# Patient Record
Sex: FEMALE | Race: WHITE | Employment: UNEMPLOYED | URBAN - METROPOLITAN AREA
[De-identification: names, ages, dates, MRNs, and addresses within clinical notes are randomized per-mention and may not be internally consistent; named-entity substitution may affect disease eponyms.]

---

## 2017-03-08 PROBLEM — M19.042 PRIMARY OSTEOARTHRITIS OF BOTH HANDS: Status: ACTIVE | Noted: 2017-03-08

## 2017-03-08 PROBLEM — M19.041 PRIMARY OSTEOARTHRITIS OF BOTH HANDS: Status: ACTIVE | Noted: 2017-03-08

## 2017-05-08 PROBLEM — M15.9 PRIMARY OSTEOARTHRITIS INVOLVING MULTIPLE JOINTS: Status: ACTIVE | Noted: 2017-03-08

## 2018-05-21 PROBLEM — E11.21 TYPE 2 DIABETES WITH NEPHROPATHY (HCC): Status: ACTIVE | Noted: 2018-05-21

## 2019-03-25 ENCOUNTER — HOSPITAL ENCOUNTER (EMERGENCY)
Age: 84
Discharge: HOME | End: 2019-03-25
Attending: EMERGENCY MEDICINE
Payer: MEDICARE

## 2019-03-25 ENCOUNTER — APPOINTMENT (OUTPATIENT)
Dept: GENERAL RADIOLOGY | Age: 84
End: 2019-03-25
Attending: EMERGENCY MEDICINE
Payer: MEDICARE

## 2019-03-25 VITALS
BODY MASS INDEX: 19.81 KG/M2 | HEART RATE: 90 BPM | HEIGHT: 64 IN | OXYGEN SATURATION: 100 % | TEMPERATURE: 98.3 F | WEIGHT: 116 LBS | SYSTOLIC BLOOD PRESSURE: 168 MMHG | DIASTOLIC BLOOD PRESSURE: 80 MMHG | RESPIRATION RATE: 20 BRPM
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
## 2019-03-25 LAB
ATRIAL RATE: 300 BPM
CALCULATED R AXIS, ECG10: 79 DEGREES
CALCULATED T AXIS, ECG11: -119 DEGREES
DIAGNOSIS, 93000: NORMAL
Q-T INTERVAL, ECG07: 358 MS
QRS DURATION, ECG06: 128 MS
QTC CALCULATION (BEZET), ECG08: 428 MS
VENTRICULAR RATE, ECG03: 86 BPM

## 2019-03-25 PROCEDURE — 93005 ELECTROCARDIOGRAM TRACING: CPT | Performed by: EMERGENCY MEDICINE

## 2019-03-25 PROCEDURE — 73502 X-RAY EXAM HIP UNI 2-3 VIEWS: CPT

## 2019-03-25 PROCEDURE — 99284 EMERGENCY DEPT VISIT MOD MDM: CPT | Performed by: EMERGENCY MEDICINE

## 2019-03-25 RX ORDER — HYDROCODONE BITARTRATE AND ACETAMINOPHEN 5; 325 MG/1; MG/1
1 TABLET ORAL
Qty: 9 TAB | Refills: 0 | Status: SHIPPED | OUTPATIENT
Start: 2019-03-25 | End: 2019-03-28

## 2019-03-25 NOTE — DISCHARGE INSTRUCTIONS
Rest and use the pain medication as prescribed. Do not mix this with your tramadol. Follow up with your doctor for return for any other acute concerns.

## 2019-03-25 NOTE — ED PROVIDER NOTES
Patient is a 72-year-old female who comes to the emergency department today via EMS from her home. She suffered an accidental fall this morning around 6:30 AM when she got up from bed to use her bedside commode. She landed on her right hip. She was able to get back up but was having a lot of pain. Unable to walk on the legs due to pain. She did not lose consciousness. The history is provided by the patient, a relative and the EMS personnel. Fall The accident occurred 6 to 12 hours ago. The fall occurred while standing. She fell from a height of ground level. She landed on hard floor. There was no blood loss. The point of impact was the right hip. The pain is present in the right hip. The pain is moderate. She was not ambulatory at the scene. There was no entrapment after the fall. There was no drug use involved in the accident. There was no alcohol use involved in the accident. Pertinent negatives include no fever, no abdominal pain, no nausea, no loss of consciousness and no laceration. The risk factors include being elderly. The symptoms are aggravated by use of injured limb. She has tried nothing for the symptoms. Past Medical History:  
Diagnosis Date  Anxiety  Asthma  Atrial fibrillation ([address]  Atypical ductal hyperplasia of breast   
 Back pain TENS unit  CAD (coronary artery disease)  Constipation 6/17/2015  CVA (cerebral vascular accident) ([address] 10/29/2010  CVA (cerebral vascular accident) ([address] 10/29/2010  DM (diabetes mellitus) ([address]  DM (diabetes mellitus) ([address]  Osteoarthrosis, unspecified whether generalized or localized, hand 9/4/2013  Pain in joint, lower leg 9/4/2013 Past Surgical History:  
Procedure Laterality Date  HX CARPAL TUNNEL RELEASE    
 bilat surg  HX CATARACT REMOVAL  2005  
 left eye  HX CHOLECYSTECTOMY  HX CHOLECYSTECTOMY  HX OTHER SURGICAL    
 EYE SURGERY  
 HX RETINAL DETACHMENT REPAIR    
  TRIGGER POINT INJECTION Family History:  
Problem Relation Age of Onset  Asthma Mother  Heart Disease Mother  Dementia Father  Psychiatric Disorder Sister  Hypertension Sister  Cancer Sister Stomach  Hypertension Other Social History Socioeconomic History  Marital status:  Spouse name: Not on file  Number of children: Not on file  Years of education: Not on file  Highest education level: Not on file Occupational History  Not on file Social Needs  Financial resource strain: Not on file  Food insecurity:  
  Worry: Not on file Inability: Not on file  Transportation needs:  
  Medical: Not on file Non-medical: Not on file Tobacco Use  Smoking status: Former Smoker Packs/day: 0.50 Years: 30.00 Pack years: 15.00 Types: Cigarettes Last attempt to quit: 10/29/1985 Years since quittin.4  Smokeless tobacco: Never Used Substance and Sexual Activity  Alcohol use: No  
 Drug use: No  
 Sexual activity: Never Lifestyle  Physical activity:  
  Days per week: Not on file Minutes per session: Not on file  Stress: Not on file Relationships  Social connections:  
  Talks on phone: Not on file Gets together: Not on file Attends Taoist service: Not on file Active member of club or organization: Not on file Attends meetings of clubs or organizations: Not on file Relationship status: Not on file  Intimate partner violence:  
  Fear of current or ex partner: Not on file Emotionally abused: Not on file Physically abused: Not on file Forced sexual activity: Not on file Other Topics Concern  Not on file Social History Narrative . 2 children. Homemaker ALLERGIES: Codeine and Other medication Review of Systems Constitutional: Negative for chills, fatigue and fever. HENT: Negative for congestion, rhinorrhea and sore throat. Eyes: Negative for pain, discharge and visual disturbance. Respiratory: Negative for cough and shortness of breath. Cardiovascular: Negative for chest pain and palpitations. Gastrointestinal: Negative for abdominal pain, diarrhea and nausea. Endocrine: Negative for polydipsia and polyuria. Genitourinary: Negative for dysuria, frequency and urgency. Musculoskeletal: Negative for back pain and neck pain. Skin: Negative for rash. Neurological: Negative for seizures, loss of consciousness, syncope and weakness. Hematological: Negative. Vitals:  
 03/25/19 1253 BP: 150/73 Pulse: 97 Resp: 18 Temp: 97.9 °F (36.6 °C) SpO2: 96% Weight: 52.6 kg (116 lb) Height: 5' 4\" (1.626 m) Physical Exam  
Constitutional: She is oriented to person, place, and time. She appears well-developed and well-nourished. HENT:  
Head: Normocephalic and atraumatic. Eyes: Pupils are equal, round, and reactive to light. Conjunctivae and EOM are normal.  
Neck: Normal range of motion. Neck supple. Cardiovascular: Normal rate, regular rhythm and intact distal pulses. Pulmonary/Chest: Effort normal and breath sounds normal.  
Abdominal: Soft. There is no tenderness. There is no rebound and no guarding. Musculoskeletal: Normal range of motion. She exhibits tenderness. She exhibits no edema or deformity. Tender over the right hip and pain with any range of motion Lymphadenopathy:  
  She has no cervical adenopathy. Neurological: She is alert and oriented to person, place, and time. She has normal strength. No cranial nerve deficit or sensory deficit. GCS eye subscore is 4. GCS verbal subscore is 5. GCS motor subscore is 6. Skin: Skin is warm and dry. No laceration and no rash noted. Nursing note and vitals reviewed. MDM Number of Diagnoses or Management Options Diagnosis management comments: 1:45 PM 
 xray of the right hip shows extensive arthritic changes but no acute fracture or dislocation I discussed these results with patient and her family members. They report that she chronically takes Tylenol and tramadol at home for pain and that is not controlling her pain they are asking for some Lortab. She has taken that in the past.  I will prescribe some for a few days. Voice dictation software was used during the making of this note. This software is not perfect and grammatical and other typographical errors may be present. This note has been proofread, but may still contain errors. Aaron Paul MD; 3/25/2019 @1:45 PM  
=================================================================== Amount and/or Complexity of Data Reviewed Tests in the radiology section of CPT®: ordered and reviewed Obtain history from someone other than the patient: yes Review and summarize past medical records: yes Independent visualization of images, tracings, or specimens: yes Risk of Complications, Morbidity, and/or Mortality Presenting problems: low Diagnostic procedures: low Management options: minimal 
 
Patient Progress Patient progress: stable Procedures

## 2019-03-25 NOTE — ED TRIAGE NOTES
Pt arrives via [facility] from home with CC of fall. Pt fell this morning when getting up from bed and landed on right hip. EMS reports palpable popping. Patient reports pain 10/10 when moving. Previous history of left sided hip problems per EMS

## 2019-03-25 NOTE — ED NOTES
I have reviewed discharge instructions with the patient. The patient verbalized understanding. Patient left ED via Discharge Method: ambulatory to Home with family Opportunity for questions and clarification provided. Patient given 1 scripts. To continue your aftercare when you leave the hospital, you may receive an automated call from our care team to check in on how you are doing. This is a free service and part of our promise to provide the best care and service to meet your aftercare needs.  If you have questions, or wish to unsubscribe from this service please call [phone]. Thank you for Choosing our [facility] Emergency Department.

## 2019-11-23 ENCOUNTER — HOSPITAL ENCOUNTER (INPATIENT)
Age: 84
LOS: 3 days | Discharge: HOME HEALTH | DRG: 092 | End: 2019-11-26
Attending: EMERGENCY MEDICINE | Admitting: HOSPITALIST
Payer: MEDICARE

## 2019-11-23 ENCOUNTER — APPOINTMENT (OUTPATIENT)
Dept: CT IMAGING | Age: 84
DRG: 092 | End: 2019-11-23
Attending: EMERGENCY MEDICINE
Payer: MEDICARE

## 2019-11-23 DIAGNOSIS — R10.32 ABDOMINAL PAIN, LLQ (LEFT LOWER QUADRANT): Primary | ICD-10-CM

## 2019-11-23 PROBLEM — I25.10 CAD (CORONARY ARTERY DISEASE): Status: ACTIVE | Noted: 2019-11-23

## 2019-11-23 PROBLEM — N39.0 ACUTE UTI (URINARY TRACT INFECTION): Status: ACTIVE | Noted: 2019-11-23

## 2019-11-23 PROBLEM — F03.90 DEMENTIA (HCC): Status: ACTIVE | Noted: 2019-11-23

## 2019-11-23 PROBLEM — G93.40 ACUTE ENCEPHALOPATHY: Status: ACTIVE | Noted: 2019-11-23

## 2019-11-23 PROBLEM — R10.9 ABDOMINAL PAIN: Status: ACTIVE | Noted: 2019-11-23

## 2019-11-23 LAB
ALBUMIN SERPL-MCNC: 3.7 G/DL (ref 3.2–4.6)
ALBUMIN/GLOB SERPL: 1 {RATIO} (ref 1.2–3.5)
ALP SERPL-CCNC: 83 U/L (ref 50–136)
ALT SERPL-CCNC: 27 U/L (ref 12–65)
ANION GAP SERPL CALC-SCNC: 8 MMOL/L (ref 7–16)
AST SERPL-CCNC: 28 U/L (ref 15–37)
BACTERIA URNS QL MICRO: 0 /HPF
BASOPHILS # BLD: 0 K/UL (ref 0–0.2)
BASOPHILS NFR BLD: 0 % (ref 0–2)
BILIRUB SERPL-MCNC: 0.6 MG/DL (ref 0.2–1.1)
BUN SERPL-MCNC: 14 MG/DL (ref 8–23)
CALCIUM SERPL-MCNC: 9.8 MG/DL (ref 8.3–10.4)
CASTS URNS QL MICRO: NORMAL /LPF
CHLORIDE SERPL-SCNC: 96 MMOL/L (ref 98–107)
CO2 SERPL-SCNC: 28 MMOL/L (ref 21–32)
CREAT SERPL-MCNC: 0.81 MG/DL (ref 0.6–1)
DIFFERENTIAL METHOD BLD: ABNORMAL
EOSINOPHIL # BLD: 0 K/UL (ref 0–0.8)
EOSINOPHIL NFR BLD: 0 % (ref 0.5–7.8)
EPI CELLS #/AREA URNS HPF: NORMAL /HPF
ERYTHROCYTE [DISTWIDTH] IN BLOOD BY AUTOMATED COUNT: 13.7 % (ref 11.9–14.6)
GLOBULIN SER CALC-MCNC: 3.7 G/DL (ref 2.3–3.5)
GLUCOSE BLD STRIP.AUTO-MCNC: 190 MG/DL (ref 65–100)
GLUCOSE SERPL-MCNC: 104 MG/DL (ref 65–100)
HCT VFR BLD AUTO: 44.2 % (ref 35.8–46.3)
HGB BLD-MCNC: 14.8 G/DL (ref 11.7–15.4)
IMM GRANULOCYTES # BLD AUTO: 0 K/UL (ref 0–0.5)
IMM GRANULOCYTES NFR BLD AUTO: 0 % (ref 0–5)
LACTATE SERPL-SCNC: 2 MMOL/L (ref 0.4–2)
LYMPHOCYTES # BLD: 1.7 K/UL (ref 0.5–4.6)
LYMPHOCYTES NFR BLD: 13 % (ref 13–44)
MCH RBC QN AUTO: 29.4 PG (ref 26.1–32.9)
MCHC RBC AUTO-ENTMCNC: 33.5 G/DL (ref 31.4–35)
MCV RBC AUTO: 87.9 FL (ref 79.6–97.8)
MONOCYTES # BLD: 1.1 K/UL (ref 0.1–1.3)
MONOCYTES NFR BLD: 8 % (ref 4–12)
NEUTS SEG # BLD: 10.2 K/UL (ref 1.7–8.2)
NEUTS SEG NFR BLD: 78 % (ref 43–78)
NRBC # BLD: 0 K/UL (ref 0–0.2)
PLATELET # BLD AUTO: 346 K/UL (ref 150–450)
PMV BLD AUTO: 9.9 FL (ref 9.4–12.3)
POTASSIUM SERPL-SCNC: 4.7 MMOL/L (ref 3.5–5.1)
PROT SERPL-MCNC: 7.4 G/DL (ref 6.3–8.2)
RBC # BLD AUTO: 5.03 M/UL (ref 4.05–5.2)
RBC #/AREA URNS HPF: NORMAL /HPF
SODIUM SERPL-SCNC: 132 MMOL/L (ref 136–145)
WBC # BLD AUTO: 13.1 K/UL (ref 4.3–11.1)
WBC URNS QL MICRO: NORMAL /HPF

## 2019-11-23 PROCEDURE — 74011000258 HC RX REV CODE- 258: Performed by: EMERGENCY MEDICINE

## 2019-11-23 PROCEDURE — 51701 INSERT BLADDER CATHETER: CPT | Performed by: EMERGENCY MEDICINE

## 2019-11-23 PROCEDURE — 81015 MICROSCOPIC EXAM OF URINE: CPT

## 2019-11-23 PROCEDURE — 65270000029 HC RM PRIVATE

## 2019-11-23 PROCEDURE — 93005 ELECTROCARDIOGRAM TRACING: CPT | Performed by: HOSPITALIST

## 2019-11-23 PROCEDURE — 74011250636 HC RX REV CODE- 250/636: Performed by: EMERGENCY MEDICINE

## 2019-11-23 PROCEDURE — 82962 GLUCOSE BLOOD TEST: CPT

## 2019-11-23 PROCEDURE — 74011000250 HC RX REV CODE- 250: Performed by: EMERGENCY MEDICINE

## 2019-11-23 PROCEDURE — 87086 URINE CULTURE/COLONY COUNT: CPT

## 2019-11-23 PROCEDURE — 80053 COMPREHEN METABOLIC PANEL: CPT

## 2019-11-23 PROCEDURE — 96374 THER/PROPH/DIAG INJ IV PUSH: CPT | Performed by: EMERGENCY MEDICINE

## 2019-11-23 PROCEDURE — 83605 ASSAY OF LACTIC ACID: CPT

## 2019-11-23 PROCEDURE — 74177 CT ABD & PELVIS W/CONTRAST: CPT

## 2019-11-23 PROCEDURE — 74011636637 HC RX REV CODE- 636/637: Performed by: HOSPITALIST

## 2019-11-23 PROCEDURE — 77030011943

## 2019-11-23 PROCEDURE — 94760 N-INVAS EAR/PLS OXIMETRY 1: CPT

## 2019-11-23 PROCEDURE — 36415 COLL VENOUS BLD VENIPUNCTURE: CPT

## 2019-11-23 PROCEDURE — 85025 COMPLETE CBC W/AUTO DIFF WBC: CPT

## 2019-11-23 PROCEDURE — 74011636320 HC RX REV CODE- 636/320: Performed by: EMERGENCY MEDICINE

## 2019-11-23 PROCEDURE — 74011250637 HC RX REV CODE- 250/637: Performed by: EMERGENCY MEDICINE

## 2019-11-23 PROCEDURE — 77030020263 HC SOL INJ SOD CL0.9% LFCR 1000ML

## 2019-11-23 PROCEDURE — 96372 THER/PROPH/DIAG INJ SC/IM: CPT | Performed by: EMERGENCY MEDICINE

## 2019-11-23 PROCEDURE — 74011250636 HC RX REV CODE- 250/636: Performed by: HOSPITALIST

## 2019-11-23 PROCEDURE — 99285 EMERGENCY DEPT VISIT HI MDM: CPT | Performed by: EMERGENCY MEDICINE

## 2019-11-23 RX ORDER — SODIUM CHLORIDE 0.9 % (FLUSH) 0.9 %
5-40 SYRINGE (ML) INJECTION AS NEEDED
Status: DISCONTINUED | OUTPATIENT
Start: 2019-11-23 | End: 2019-11-26 | Stop reason: HOSPADM

## 2019-11-23 RX ORDER — LORAZEPAM 1 MG/1
1 TABLET ORAL
Status: COMPLETED | OUTPATIENT
Start: 2019-11-23 | End: 2019-11-23

## 2019-11-23 RX ORDER — DICLOFENAC SODIUM 10 MG/G
4 GEL TOPICAL 4 TIMES DAILY
Status: DISCONTINUED | OUTPATIENT
Start: 2019-11-23 | End: 2019-11-26 | Stop reason: HOSPADM

## 2019-11-23 RX ORDER — LANOLIN ALCOHOL/MO/W.PET/CERES
1000 CREAM (GRAM) TOPICAL
Status: DISCONTINUED | OUTPATIENT
Start: 2019-11-24 | End: 2019-11-26 | Stop reason: HOSPADM

## 2019-11-23 RX ORDER — ASPIRIN 325 MG
325 TABLET ORAL DAILY
Status: DISCONTINUED | OUTPATIENT
Start: 2019-11-24 | End: 2019-11-26 | Stop reason: HOSPADM

## 2019-11-23 RX ORDER — LORAZEPAM 0.5 MG/1
0.25 TABLET ORAL
Status: DISCONTINUED | OUTPATIENT
Start: 2019-11-23 | End: 2019-11-24

## 2019-11-23 RX ORDER — BISACODYL 5 MG
5 TABLET, DELAYED RELEASE (ENTERIC COATED) ORAL DAILY PRN
Status: DISCONTINUED | OUTPATIENT
Start: 2019-11-23 | End: 2019-11-26 | Stop reason: HOSPADM

## 2019-11-23 RX ORDER — LORAZEPAM 2 MG/ML
1 INJECTION INTRAMUSCULAR ONCE
Status: DISCONTINUED | OUTPATIENT
Start: 2019-11-23 | End: 2019-11-23 | Stop reason: SDUPTHER

## 2019-11-23 RX ORDER — ACETAMINOPHEN 325 MG/1
650 TABLET ORAL
Status: DISCONTINUED | OUTPATIENT
Start: 2019-11-23 | End: 2019-11-26 | Stop reason: HOSPADM

## 2019-11-23 RX ORDER — DEXTROSE 50 % IN WATER (D50W) INTRAVENOUS SYRINGE
25-50 AS NEEDED
Status: DISCONTINUED | OUTPATIENT
Start: 2019-11-23 | End: 2019-11-26 | Stop reason: HOSPADM

## 2019-11-23 RX ORDER — DONEPEZIL HYDROCHLORIDE 5 MG/1
5 TABLET, FILM COATED ORAL
Status: DISCONTINUED | OUTPATIENT
Start: 2019-11-23 | End: 2019-11-26 | Stop reason: HOSPADM

## 2019-11-23 RX ORDER — IPRATROPIUM BROMIDE AND ALBUTEROL SULFATE 2.5; .5 MG/3ML; MG/3ML
3 SOLUTION RESPIRATORY (INHALATION)
Status: DISCONTINUED | OUTPATIENT
Start: 2019-11-23 | End: 2019-11-26 | Stop reason: HOSPADM

## 2019-11-23 RX ORDER — TRAMADOL HYDROCHLORIDE 50 MG/1
50 TABLET ORAL
Status: DISCONTINUED | OUTPATIENT
Start: 2019-11-23 | End: 2019-11-24

## 2019-11-23 RX ORDER — FERROUS SULFATE, DRIED 160(50) MG
1 TABLET, EXTENDED RELEASE ORAL 2 TIMES DAILY WITH MEALS
Status: DISCONTINUED | OUTPATIENT
Start: 2019-11-24 | End: 2019-11-26 | Stop reason: HOSPADM

## 2019-11-23 RX ORDER — SODIUM CHLORIDE 0.9 % (FLUSH) 0.9 %
5-40 SYRINGE (ML) INJECTION EVERY 8 HOURS
Status: DISCONTINUED | OUTPATIENT
Start: 2019-11-23 | End: 2019-11-26 | Stop reason: HOSPADM

## 2019-11-23 RX ORDER — DOCUSATE SODIUM 100 MG/1
100 CAPSULE, LIQUID FILLED ORAL DAILY
Status: DISCONTINUED | OUTPATIENT
Start: 2019-11-24 | End: 2019-11-26 | Stop reason: HOSPADM

## 2019-11-23 RX ORDER — LORAZEPAM 2 MG/ML
INJECTION INTRAMUSCULAR
Status: DISCONTINUED
Start: 2019-11-23 | End: 2019-11-23 | Stop reason: SDUPTHER

## 2019-11-23 RX ORDER — FAMOTIDINE 20 MG/1
20 TABLET, FILM COATED ORAL
Status: DISCONTINUED | OUTPATIENT
Start: 2019-11-23 | End: 2019-11-26 | Stop reason: HOSPADM

## 2019-11-23 RX ORDER — BUDESONIDE 0.5 MG/2ML
500 INHALANT ORAL
Status: DISCONTINUED | OUTPATIENT
Start: 2019-11-24 | End: 2019-11-26 | Stop reason: HOSPADM

## 2019-11-23 RX ORDER — DEXTROSE 40 %
15 GEL (GRAM) ORAL AS NEEDED
Status: DISCONTINUED | OUTPATIENT
Start: 2019-11-23 | End: 2019-11-26 | Stop reason: HOSPADM

## 2019-11-23 RX ORDER — INSULIN LISPRO 100 [IU]/ML
INJECTION, SOLUTION INTRAVENOUS; SUBCUTANEOUS
Status: DISCONTINUED | OUTPATIENT
Start: 2019-11-23 | End: 2019-11-26 | Stop reason: HOSPADM

## 2019-11-23 RX ORDER — PRAVASTATIN SODIUM 20 MG/1
40 TABLET ORAL
Status: DISCONTINUED | OUTPATIENT
Start: 2019-11-23 | End: 2019-11-26 | Stop reason: HOSPADM

## 2019-11-23 RX ORDER — SODIUM CHLORIDE 0.9 % (FLUSH) 0.9 %
10 SYRINGE (ML) INJECTION
Status: COMPLETED | OUTPATIENT
Start: 2019-11-23 | End: 2019-11-23

## 2019-11-23 RX ORDER — SODIUM CHLORIDE 9 MG/ML
75 INJECTION, SOLUTION INTRAVENOUS CONTINUOUS
Status: DISCONTINUED | OUTPATIENT
Start: 2019-11-23 | End: 2019-11-26 | Stop reason: HOSPADM

## 2019-11-23 RX ORDER — ENOXAPARIN SODIUM 100 MG/ML
40 INJECTION SUBCUTANEOUS EVERY 24 HOURS
Status: DISCONTINUED | OUTPATIENT
Start: 2019-11-23 | End: 2019-11-26 | Stop reason: HOSPADM

## 2019-11-23 RX ORDER — DILTIAZEM HYDROCHLORIDE 240 MG/1
240 CAPSULE, COATED, EXTENDED RELEASE ORAL DAILY
Status: DISCONTINUED | OUTPATIENT
Start: 2019-11-24 | End: 2019-11-26 | Stop reason: HOSPADM

## 2019-11-23 RX ADMIN — Medication 10 ML: at 22:25

## 2019-11-23 RX ADMIN — INSULIN LISPRO 2 UNITS: 100 INJECTION, SOLUTION INTRAVENOUS; SUBCUTANEOUS at 22:28

## 2019-11-23 RX ADMIN — CEFTRIAXONE 1 G: 1 INJECTION, POWDER, FOR SOLUTION INTRAMUSCULAR; INTRAVENOUS at 19:11

## 2019-11-23 RX ADMIN — LORAZEPAM 1 MG: 1 TABLET ORAL at 17:02

## 2019-11-23 RX ADMIN — Medication 10 ML: at 17:50

## 2019-11-23 RX ADMIN — ENOXAPARIN SODIUM 40 MG: 40 INJECTION SUBCUTANEOUS at 22:15

## 2019-11-23 RX ADMIN — SODIUM CHLORIDE 1000 ML: 900 INJECTION, SOLUTION INTRAVENOUS at 19:12

## 2019-11-23 RX ADMIN — DIATRIZOATE MEGLUMINE AND DIATRIZOATE SODIUM 15 ML: 660; 100 LIQUID ORAL; RECTAL at 16:36

## 2019-11-23 RX ADMIN — IOPAMIDOL 100 ML: 755 INJECTION, SOLUTION INTRAVENOUS at 17:50

## 2019-11-23 RX ADMIN — SODIUM CHLORIDE 100 ML: 900 INJECTION, SOLUTION INTRAVENOUS at 17:50

## 2019-11-23 RX ADMIN — WATER 20 MG: 1 INJECTION INTRAMUSCULAR; INTRAVENOUS; SUBCUTANEOUS at 17:17

## 2019-11-23 NOTE — ED NOTES
Pt is yelling out. This nurse and Joylene Cowden entered room with pt attempting to exit bed and family attempting to calm pt. Pt is not able to be oriented, even after several attempts. Pt is combative and hitting family and staff intermittently. Pt yells out, but does not make sense. Pt attempted to pull out catheter. Attempt to reposition pt and re-orient, again, pt no susceptible. MD notified. IV ativan ordered. IV attmpted x 3 without success. Pt continues to be agitated, combative, and striking staff and family and trying to get out of bed. Pt is not able to ambulate. MD notified and request for IM medications. MD declines at this time and verbal order for 1mg Ativan PO obtained.

## 2019-11-23 NOTE — ED PROVIDER NOTES
Patient is a 60-year-old female with a past medical history significant for dementia who presents with worsening dementia today and some lower abdominal pain. Per caretaker there is no nausea or vomiting, there is no fevers or chills, no chest pain or shortness of breath.            Past Medical History:   Diagnosis Date    Anxiety     Asthma     Atrial fibrillation (HCC)     Atypical ductal hyperplasia of breast     Back pain     TENS unit    CAD (coronary artery disease)     Constipation 6/17/2015    [facility]A [facility]cerebral vascular accident) [facility] 10/29/2010    CVA (cerebral vascular accident) [facility] 10/29/2010    DM (diabetes mellitus) [facility]     DM (diabetes mellitus) [facility]     Osteoarthrosis, unspecified whether generalized or localized, hand 9/4/2013    Pain in joint, lower leg 9/4/2013       Past Surgical History:   Procedure Laterality Date    HX CARPAL TUNNEL RELEASE      bilat surg    HX CATARACT REMOVAL  2005    left eye    HX CHOLECYSTECTOMY      HX CHOLECYSTECTOMY      HX OTHER SURGICAL      EYE SURGERY    HX RETINAL DETACHMENT REPAIR      TRIGGER POINT INJECTION           Family History:   Problem Relation Age of Onset    Asthma Mother     Heart Disease Mother     Dementia Father     Psychiatric Disorder Sister     Hypertension Sister     Cancer Sister         Stomach    Hypertension Other        Social History     Socioeconomic History    Marital status:      Spouse name: Not on file    Number of children: Not on file    Years of education: Not on file    Highest education level: Not on file   Occupational History    Not on file   Social Needs    Financial resource strain: Not on file    Food insecurity:     Worry: Not on file     Inability: Not on file    Transportation needs:     Medical: Not on file     Non-medical: Not on file   Tobacco Use    Smoking status: Former Smoker     Packs/day: 0.50     Years: 30.00     Pack years: 15.00     Types: Cigarettes     Last attempt to quit: 10/29/1985     Years since quittin.0    Smokeless tobacco: Never Used   Substance and Sexual Activity    Alcohol use: No    Drug use: No    Sexual activity: Never   Lifestyle    Physical activity:     Days per week: Not on file     Minutes per session: Not on file    Stress: Not on file   Relationships    Social connections:     Talks on phone: Not on file     Gets together: Not on file     Attends Oriental orthodox service: Not on file     Active member of club or organization: Not on file     Attends meetings of clubs or organizations: Not on file     Relationship status: Not on file    Intimate partner violence:     Fear of current or ex partner: Not on file     Emotionally abused: Not on file     Physically abused: Not on file     Forced sexual activity: Not on file   Other Topics Concern    Not on file   Social History Narrative    . 2 children. Homemaker         ALLERGIES: Codeine and Other medication    Review of Systems   Constitutional: Negative for chills and fever. HENT: Negative for rhinorrhea and sore throat. Eyes: Negative for visual disturbance. Respiratory: Negative for cough and shortness of breath. Cardiovascular: Negative for chest pain and leg swelling. Gastrointestinal: Positive for abdominal pain. Negative for diarrhea, nausea and vomiting. Genitourinary: Negative for dysuria. Musculoskeletal: Negative for back pain and neck pain. Skin: Negative for rash. Neurological: Negative for weakness and headaches. Psychiatric/Behavioral: The patient is not nervous/anxious. Vitals:    19 1458 19 1504   BP: 132/64 119/62   Pulse:  94   Resp:  18   Temp:  97.8 °F (36.6 °C)   SpO2: 96% 96%            Physical Exam  Vitals signs and nursing note reviewed. Constitutional:       Appearance: She is well-developed. HENT:      Head: Normocephalic.       Right Ear: External ear normal.      Left Ear: External ear normal.   Eyes: Conjunctiva/sclera: Conjunctivae normal.      Pupils: Pupils are equal, round, and reactive to light. Neck:      Musculoskeletal: Normal range of motion and neck supple. Trachea: No tracheal deviation. Cardiovascular:      Rate and Rhythm: Normal rate and regular rhythm. Heart sounds: Normal heart sounds. No murmur. Pulmonary:      Effort: Pulmonary effort is normal. No respiratory distress. Breath sounds: Normal breath sounds. Abdominal:      General: There is distension. Palpations: Abdomen is soft. Tenderness: There is tenderness (diffuse abdominal tenderness without rebound or gaurding. ). Musculoskeletal: Normal range of motion. Skin:     Findings: No rash. Neurological:      Mental Status: She is alert and oriented to person, place, and time. Cranial Nerves: No cranial nerve deficit.       Comments: Severe dementia at baseline makes full neurological examination difficulty however moves all extremities without difficulty and CN 2-12 appear grossly intact          MDM  Number of Diagnoses or Management Options     Amount and/or Complexity of Data Reviewed  Clinical lab tests: ordered and reviewed  Tests in the radiology section of CPT®: ordered and reviewed  Tests in the medicine section of CPT®: ordered and reviewed    Risk of Complications, Morbidity, and/or Mortality  Presenting problems: high  Diagnostic procedures: high  Management options: high    Patient Progress  Patient progress: stable         Procedures    Recent Results (from the past 12 hour(s))   CBC WITH AUTOMATED DIFF    Collection Time: 11/23/19  3:36 PM   Result Value Ref Range    WBC 13.1 (H) 4.3 - 11.1 K/uL    RBC 5.03 4.05 - 5.2 M/uL    HGB 14.8 11.7 - 15.4 g/dL    HCT 44.2 35.8 - 46.3 %    MCV 87.9 79.6 - 97.8 FL    MCH 29.4 26.1 - 32.9 PG    MCHC 33.5 31.4 - 35.0 g/dL    RDW 13.7 11.9 - 14.6 %    PLATELET 574 852 - 152 K/uL    MPV 9.9 9.4 - 12.3 FL    ABSOLUTE NRBC 0.00 0.0 - 0.2 K/uL    DF AUTOMATED      NEUTROPHILS 78 43 - 78 %    LYMPHOCYTES 13 13 - 44 %    MONOCYTES 8 4.0 - 12.0 %    EOSINOPHILS 0 (L) 0.5 - 7.8 %    BASOPHILS 0 0.0 - 2.0 %    IMMATURE GRANULOCYTES 0 0.0 - 5.0 %    ABS. NEUTROPHILS 10.2 (H) 1.7 - 8.2 K/UL    ABS. LYMPHOCYTES 1.7 0.5 - 4.6 K/UL    ABS. MONOCYTES 1.1 0.1 - 1.3 K/UL    ABS. EOSINOPHILS 0.0 0.0 - 0.8 K/UL    ABS. BASOPHILS 0.0 0.0 - 0.2 K/UL    ABS. IMM. GRANS. 0.0 0.0 - 0.5 K/UL   METABOLIC PANEL, COMPREHENSIVE    Collection Time: 11/23/19  3:36 PM   Result Value Ref Range    Sodium 132 (L) 136 - 145 mmol/L    Potassium 4.7 3.5 - 5.1 mmol/L    Chloride 96 (L) 98 - 107 mmol/L    CO2 28 21 - 32 mmol/L    Anion gap 8 7 - 16 mmol/L    Glucose 104 (H) 65 - 100 mg/dL    BUN 14 8 - 23 MG/DL    Creatinine 0.81 0.6 - 1.0 MG/DL    GFR est AA >60 >60 ml/min/1.73m2    GFR est non-AA >60 >60 ml/min/1.73m2    Calcium 9.8 8.3 - 10.4 MG/DL    Bilirubin, total 0.6 0.2 - 1.1 MG/DL    ALT (SGPT) 27 12 - 65 U/L    AST (SGOT) 28 15 - 37 U/L    Alk. phosphatase 83 50 - 136 U/L    Protein, total 7.4 6.3 - 8.2 g/dL    Albumin 3.7 3.2 - 4.6 g/dL    Globulin 3.7 (H) 2.3 - 3.5 g/dL    A-G Ratio 1.0 (L) 1.2 - 3.5     URINE MICROSCOPIC    Collection Time: 11/23/19  4:17 PM   Result Value Ref Range    WBC 20-50 0 /hpf    RBC 0-3 0 /hpf    Epithelial cells 0-3 0 /hpf    Bacteria 0 0 /hpf    Casts 3-5 0 /lpf     Ct Abd Pelv W Cont    Result Date: 11/23/2019  CT ABDOMEN AND PELVIS WITH CONTRAST HISTORY: Abd pain, 80 years Female dementia and has been more altered then normal. Guarding left lower abdominal area. Not tender with palpation COMPARISON: None available TECHNIQUE: Oral contrast was not administered. 100 cc of nonionic intravenous contrast was injected, and axial helical CT images were obtained from above the diaphragm through the pelvis. Coronal reformatted images were obtained at the scanner console and made available for review.   All CT scans at this location are performed using dose modulation techniques as appropriate to a performed exam including the following: automated exposure control; adjustment of the mA and/or kV according to patient's size (this includes techniques or standardized protocols for targeted exams where dose is matched to indication/reason for exam; i.e. extremities or head); use of iterative reconstruction technique. FINDINGS: ABDOMEN: Minimal dependent subsegmental atelectasis bilateral lung bases. Mild diffuse intrahepatic and extrahepatic biliary ductal dilatation, which may be secondary to post cholecystectomy state. Mild diffuse atrophy of the pancreas. Normal-appearing spleen, bilateral adrenal glands and left kidney. Small simple appearing right renal cortical cyst.  Severe calcific atherosclerosis of a normal caliber abdominal aorta. No evidence of significant lymphadenopathy. Normal-appearing small bowel. No evidence of intraperitoneal free air or free fluid. Image quality somewhat degraded by motion artifact, despite repeat imaging. PELVIS: Normal-appearing urinary bladder. Normal-appearing atrophic uterus and bilateral adnexa. Large stool ball in the rectum. Stool is also seen throughout the colon. Appendix not visualized in the absence of oral contrast administration and significant motion artifact. No evidence of pelvic free fluid. No evidence of significant inguinal or pelvic sidewall lymphadenopathy. Visualized osseous structures unremarkable. IMPRESSION: No definite acute pathology identified, however there is significant motion artifact. Stool throughout the colon and rectum. Other incidental findings as above. 26-year-old female with abdominal pain:    Feel symptoms related to UTI. She has no falls, denies headache and calmed with geodon so see no reason for CT head at this time which I discussed with Dr. Matt Sánchez upon admission, will start iv abx.

## 2019-11-24 ENCOUNTER — APPOINTMENT (OUTPATIENT)
Dept: GENERAL RADIOLOGY | Age: 84
DRG: 092 | End: 2019-11-24
Attending: INTERNAL MEDICINE
Payer: MEDICARE

## 2019-11-24 ENCOUNTER — APPOINTMENT (OUTPATIENT)
Dept: CT IMAGING | Age: 84
DRG: 092 | End: 2019-11-24
Attending: PHYSICIAN ASSISTANT
Payer: MEDICARE

## 2019-11-24 LAB
ANION GAP SERPL CALC-SCNC: 12 MMOL/L (ref 7–16)
ATRIAL RATE: 340 BPM
BUN SERPL-MCNC: 11 MG/DL (ref 8–23)
CALCIUM SERPL-MCNC: 9.3 MG/DL (ref 8.3–10.4)
CALCULATED R AXIS, ECG10: 72 DEGREES
CALCULATED T AXIS, ECG11: -77 DEGREES
CHLORIDE SERPL-SCNC: 97 MMOL/L (ref 98–107)
CO2 SERPL-SCNC: 26 MMOL/L (ref 21–32)
CREAT SERPL-MCNC: 0.77 MG/DL (ref 0.6–1)
DIAGNOSIS, 93000: NORMAL
ERYTHROCYTE [DISTWIDTH] IN BLOOD BY AUTOMATED COUNT: 13.8 % (ref 11.9–14.6)
GLUCOSE BLD STRIP.AUTO-MCNC: 127 MG/DL (ref 65–100)
GLUCOSE BLD STRIP.AUTO-MCNC: 129 MG/DL (ref 65–100)
GLUCOSE BLD STRIP.AUTO-MCNC: 141 MG/DL (ref 65–100)
GLUCOSE BLD STRIP.AUTO-MCNC: 145 MG/DL (ref 65–100)
GLUCOSE SERPL-MCNC: 138 MG/DL (ref 65–100)
HCT VFR BLD AUTO: 42 % (ref 35.8–46.3)
HGB BLD-MCNC: 13.9 G/DL (ref 11.7–15.4)
LACTATE SERPL-SCNC: 1.7 MMOL/L (ref 0.4–2)
MAGNESIUM SERPL-MCNC: 1.8 MG/DL (ref 1.8–2.4)
MCH RBC QN AUTO: 29.1 PG (ref 26.1–32.9)
MCHC RBC AUTO-ENTMCNC: 33.1 G/DL (ref 31.4–35)
MCV RBC AUTO: 88.1 FL (ref 79.6–97.8)
NRBC # BLD: 0 K/UL (ref 0–0.2)
PLATELET # BLD AUTO: 343 K/UL (ref 150–450)
PMV BLD AUTO: 9.7 FL (ref 9.4–12.3)
POTASSIUM SERPL-SCNC: 3.4 MMOL/L (ref 3.5–5.1)
Q-T INTERVAL, ECG07: 334 MS
QRS DURATION, ECG06: 122 MS
QTC CALCULATION (BEZET), ECG08: 397 MS
RBC # BLD AUTO: 4.77 M/UL (ref 4.05–5.2)
SODIUM SERPL-SCNC: 135 MMOL/L (ref 136–145)
VENTRICULAR RATE, ECG03: 85 BPM
WBC # BLD AUTO: 19.3 K/UL (ref 4.3–11.1)

## 2019-11-24 PROCEDURE — 77030019605

## 2019-11-24 PROCEDURE — 65270000029 HC RM PRIVATE

## 2019-11-24 PROCEDURE — 74011000258 HC RX REV CODE- 258: Performed by: HOSPITALIST

## 2019-11-24 PROCEDURE — 74011250637 HC RX REV CODE- 250/637: Performed by: HOSPITALIST

## 2019-11-24 PROCEDURE — 74011250636 HC RX REV CODE- 250/636: Performed by: HOSPITALIST

## 2019-11-24 PROCEDURE — 85027 COMPLETE CBC AUTOMATED: CPT

## 2019-11-24 PROCEDURE — 74011000250 HC RX REV CODE- 250: Performed by: HOSPITALIST

## 2019-11-24 PROCEDURE — 77030020263 HC SOL INJ SOD CL0.9% LFCR 1000ML

## 2019-11-24 PROCEDURE — 83735 ASSAY OF MAGNESIUM: CPT

## 2019-11-24 PROCEDURE — 36415 COLL VENOUS BLD VENIPUNCTURE: CPT

## 2019-11-24 PROCEDURE — 74011250636 HC RX REV CODE- 250/636: Performed by: PHYSICIAN ASSISTANT

## 2019-11-24 PROCEDURE — 74011000250 HC RX REV CODE- 250: Performed by: PHYSICIAN ASSISTANT

## 2019-11-24 PROCEDURE — 83605 ASSAY OF LACTIC ACID: CPT

## 2019-11-24 PROCEDURE — 94640 AIRWAY INHALATION TREATMENT: CPT

## 2019-11-24 PROCEDURE — 70450 CT HEAD/BRAIN W/O DYE: CPT

## 2019-11-24 PROCEDURE — 82962 GLUCOSE BLOOD TEST: CPT

## 2019-11-24 PROCEDURE — 74011250637 HC RX REV CODE- 250/637: Performed by: PHYSICIAN ASSISTANT

## 2019-11-24 PROCEDURE — 77030041247 HC PROTECTOR HEEL HEELMEDIX MDII -B

## 2019-11-24 PROCEDURE — 94760 N-INVAS EAR/PLS OXIMETRY 1: CPT

## 2019-11-24 PROCEDURE — 71045 X-RAY EXAM CHEST 1 VIEW: CPT

## 2019-11-24 PROCEDURE — 77030038269 HC DRN EXT URIN PURWCK BARD -A

## 2019-11-24 PROCEDURE — 80048 BASIC METABOLIC PNL TOTAL CA: CPT

## 2019-11-24 RX ORDER — HALOPERIDOL 5 MG/ML
2 INJECTION INTRAMUSCULAR ONCE
Status: COMPLETED | OUTPATIENT
Start: 2019-11-24 | End: 2019-11-24

## 2019-11-24 RX ORDER — QUETIAPINE FUMARATE 25 MG/1
12.5 TABLET, FILM COATED ORAL EVERY EVENING
Status: DISCONTINUED | OUTPATIENT
Start: 2019-11-24 | End: 2019-11-26 | Stop reason: HOSPADM

## 2019-11-24 RX ADMIN — Medication 5 ML: at 05:34

## 2019-11-24 RX ADMIN — SODIUM CHLORIDE 100 ML/HR: 900 INJECTION, SOLUTION INTRAVENOUS at 05:33

## 2019-11-24 RX ADMIN — WATER 10 MG: 1 INJECTION INTRAMUSCULAR; INTRAVENOUS; SUBCUTANEOUS at 16:20

## 2019-11-24 RX ADMIN — CEFTRIAXONE 1 G: 1 INJECTION, POWDER, FOR SOLUTION INTRAMUSCULAR; INTRAVENOUS at 18:06

## 2019-11-24 RX ADMIN — CALCIUM CARBONATE 500 MG (1,250 MG)-VITAMIN D3 200 UNIT TABLET 1 TABLET: at 18:06

## 2019-11-24 RX ADMIN — IPRATROPIUM BROMIDE AND ALBUTEROL SULFATE 3 ML: .5; 3 SOLUTION RESPIRATORY (INHALATION) at 07:57

## 2019-11-24 RX ADMIN — IPRATROPIUM BROMIDE AND ALBUTEROL SULFATE 3 ML: .5; 3 SOLUTION RESPIRATORY (INHALATION) at 19:20

## 2019-11-24 RX ADMIN — BUDESONIDE 500 MCG: 0.5 INHALANT RESPIRATORY (INHALATION) at 19:20

## 2019-11-24 RX ADMIN — Medication 10 ML: at 14:18

## 2019-11-24 RX ADMIN — HALOPERIDOL LACTATE 2 MG: 5 INJECTION, SOLUTION INTRAMUSCULAR at 14:20

## 2019-11-24 RX ADMIN — IPRATROPIUM BROMIDE AND ALBUTEROL SULFATE 3 ML: .5; 3 SOLUTION RESPIRATORY (INHALATION) at 13:14

## 2019-11-24 RX ADMIN — QUETIAPINE FUMARATE 12.5 MG: 25 TABLET ORAL at 18:06

## 2019-11-24 RX ADMIN — Medication 10 ML: at 22:13

## 2019-11-24 RX ADMIN — BUDESONIDE 500 MCG: 0.5 INHALANT RESPIRATORY (INHALATION) at 07:57

## 2019-11-24 NOTE — PROGRESS NOTES
Progress Note    Patient: Elida Costello MRN: [id number]  SSN: [id number]    YOB: 1926  Age: 80 y.o. Sex: female      Admit Date: 11/23/2019    LOS: 1 day     HPI: Patient is a 79 y/o female with hx dementia, asthma, HTN, CAD, CVA, DM who arrives to ED on 11/23 via EMS with increased confusion, AMS and LLQ abdominal pain. Pt with evidence of severe constipation on CT A/P and questionable UTI on UA. Admitted for further care. Subjective:   Pt was given milk and molasses enema last night which has produced several BMs today. Per family, pt no longer appearing like she is in pain. Pt continues to be delirious. She has necessitated soft mitts due to pulling at lines. She has been refusing to take her pills for nursing and refused food today. Per family, she was started on nightly low dose Ativan use this past month by her PCP due to agitation at night. They note she has sundowning regularly at home, which starts around 5pm. She is cared after by her daughter and granddaughter. Objective:     Vitals:    11/24/19 0814 11/24/19 1154 11/24/19 1315 11/24/19 1657   BP: 126/70 152/87  143/82   Pulse: 71 85  95   Resp: 19 21  20   Temp: 97.9 °F (36.6 °C) 98 °F (36.7 °C)  97.9 °F (36.6 °C)   SpO2: 95% 97% 95% 97%   Weight:            Intake and Output:  Current Shift: No intake/output data recorded. Last three shifts: 11/22 1901 - 11/24 0700  In: 50 [I.V.:50]  Out: -     Physical Exam:   GENERAL: alert, uncooperative, delirious, appears stated age  LUNG: clear to auscultation bilaterally  HEART: regular rate and rhythm, S1, S2 normal, no murmur, click, rub or gallop  ABDOMEN: soft, non-tender.  Bowel sounds normal. No masses,  no organomegaly  EXTREMITIES:  extremities normal, atraumatic, no cyanosis or edema  SKIN: no rash or abnormalities  NEUROLOGIC: positive findings: disoriented, confused and delirious  PSYCHIATRIC: agitated    Lab/Data Review:  BMP:   Lab Results   Component Value Date/Time     (L) 11/24/2019 03:12 AM    K 3.4 (L) 11/24/2019 03:12 AM    CL 97 (L) 11/24/2019 03:12 AM    CO2 26 11/24/2019 03:12 AM    AGAP 12 11/24/2019 03:12 AM     (H) 11/24/2019 03:12 AM    BUN 11 11/24/2019 03:12 AM    CREA 0.77 11/24/2019 03:12 AM    GFRAA >60 11/24/2019 03:12 AM    GFRNA >60 11/24/2019 03:12 AM     CBC:   Lab Results   Component Value Date/Time    WBC 19.3 (H) 11/24/2019 03:12 AM    HGB 13.9 11/24/2019 03:12 AM    HCT 42.0 11/24/2019 03:12 AM     11/24/2019 03:12 AM        CT head - No acute intracranial abnormality within the limits of patient motion. Assessment:     Principal Problem:    Acute encephalopathy - suspect delirium due to constipation, possible UTI, now in new setting. Her WBC is increased, but no fever and no s/s of other infection. Suspect she would improve with going home and being in familiar setting. Try to minimize benzo use. CT head was not performed on admission, performed this afternoon. It is negative, however, pt did not lie still. Can try low dose Seroquel at night. Pt refusing to take pills. Can give IM Geodon in meantime. Active Problems:    Asthma-chronic obstructive pulmonary disease overlap syndrome - cont Duoneb and Pulmicort. Her respiratory status is normal.       Atrial fibrillation - cont diltiazem CD      Constipation - improved with enema. Essential hypertension with goal blood pressure less than 140/90 - cont home medications      Type 2 diabetes with nephropathy - cont Humalog SSI and ADA diet      Acute UTI (urinary tract infection)- so far UCx NGTD. On Rocephin day #2. Await final cxs.        CAD (coronary artery disease)- cont ASA and statin      Abdominal pain - appears to be resolved      Dementia - is on Aricept      Dispo: hopeful to d/c home tomorrow, however, needs PT eval. Family also hesitant given her continued delirium  DVT ppx: Lovenox  Care d/w Dr. Rangel Quinn By: SERA Smith November 24, 2019

## 2019-11-24 NOTE — ED NOTES
TRANSFER - OUT REPORT:    Verbal report brett joseph to [place] on [address]  being transferred to  for routine progression of care       Report consisted of patients Situation, Background, Assessment and   Recommendations(SBAR). Information from the following report(s) SBAR and MAR was reviewed with the receiving nurse. Lines:   Peripheral IV 11/23/19 Right Forearm (Active)   Site Assessment Clean, dry, & intact 11/23/2019  5:30 PM        Opportunity for questions and clarification was provided.       Patient transported with:   [employer]

## 2019-11-24 NOTE — PROGRESS NOTES
11/23/19 2045   Dual Skin Pressure Injury Assessment   Dual Skin Pressure Injury Assessment WDL   Second Care Provider (Based on Facility Policy) Modesto Young RN   Skin Integumentary   Skin Integumentary (WDL) X   Skin Color Appropriate for ethnicity; Ecchymosis (comment)  (Ecchymosis noted to BUE & BLE)   Skin Condition/Temp Dry; Warm   Skin Integrity Intact   Turgor Epidermis thin w/ loss of subcut tissue   Hair Growth Present   Wound Prevention and Protection Methods   Orientation of Wound Prevention Posterior   Location of Wound Prevention Sacrum/Coccyx   Dressing Present  No   Wound Offloading (Prevention Methods) Bed, pressure reduction mattress;Pillows;Repositioning;Turning

## 2019-11-24 NOTE — PROGRESS NOTES
Problem: Falls - Risk of  Goal: *Absence of Falls  Description  Document John Clay Fall Risk and appropriate interventions in the flowsheet. Outcome: Progressing Towards Goal  Note: Fall Risk Interventions:       Mentation Interventions: Bed/chair exit alarm, Door open when patient unattended, More frequent rounding, Reorient patient, Room close to nurse's station    Medication Interventions: Bed/chair exit alarm, Patient to call before getting OOB, Teach patient to arise slowly    Elimination Interventions: Bed/chair exit alarm, Call light in reach, Patient to call for help with toileting needs    History of Falls Interventions: Bed/chair exit alarm, Door open when patient unattended, Investigate reason for fall, Room close to nurse's station         Problem: Patient Education: Go to Patient Education Activity  Goal: Patient/Family Education  Outcome: Progressing Towards Goal     Problem: Pressure Injury - Risk of  Goal: *Prevention of pressure injury  Description  Document Michael Scale and appropriate interventions in the flowsheet.   Outcome: Progressing Towards Goal  Note: Pressure Injury Interventions:  Sensory Interventions: Assess changes in LOC, Check visual cues for pain, Keep linens dry and wrinkle-free, Minimize linen layers, Pressure redistribution bed/mattress (bed type)    Moisture Interventions: Absorbent underpads, Apply protective barrier, creams and emollients, Check for incontinence Q2 hours and as needed, Maintain skin hydration (lotion/cream), Minimize layers    Activity Interventions: Pressure redistribution bed/mattress(bed type), PT/OT evaluation    Mobility Interventions: HOB 30 degrees or less, Pressure redistribution bed/mattress (bed type), PT/OT evaluation    Nutrition Interventions: Document food/fluid/supplement intake, Offer support with meals,snacks and hydration    Friction and Shear Interventions: HOB 30 degrees or less, Minimize layers                Problem: Patient Education: Go to Patient Education Activity  Goal: Patient/Family Education  Outcome: Progressing Towards Goal

## 2019-11-24 NOTE — PROGRESS NOTES
Nutrition  Reason for assessment: Referral received from nursing admission Malnutrition Screening Tool   Recently Lost Weight Without Trying: Yes  If Yes, How Much Weight Loss: 2-13 lbs  Eating Poorly Due to Decreased Appetite: Yes  Assessment:   Diet: DIET DIABETIC CONSISTENT CARB Regular  DIET NUTRITIONAL SUPPLEMENTS All Meals; Glucerna Shake    Food/Nutrition Patient History:  PMH: dementia, asthma, HTN, CAD, CVA, DM. Presented with increased confusion, AMS and LLQ abdominal pain. CT findings of stool throughout colon. Enema given this morning with resultant large stool. Also with UTI. Pt resting now. No visitors in room. 2 unopened bottles of Glucerna shake at bedside. Anthropometrics:Height 5' 4\",  Weight: 52.4 kg (115 lb 8 oz), Weight Source: Bed, Body mass index is 19.83 kg/m². BMI class of underweight  Weight hx per EMR ( Based on [facility] care functionality, RD cannot know if these weight are actual versus stated):    WT / BMI WEIGHT   11/19/2019 112 lb   8/29/2019 107 lb   5/16/2019 105 lb 9.6 oz   4/4/2019 114 lb 9.6 oz   3/25/2019 116 lb   2/4/2019 103 lb 9.6 oz   11/20/2018 114 lb   This is consistent with weight gain rather than weight loss    Macronutrient needs:(using CBW (Current body weight) 52.4 kg)  EER:  8848-0724 kcal /day (25-30 kcal/kg CBW)  EPR:  52-66 grams protein/day (1-1.25 grams/kg CBW)    Intake/Comparative Standards: Insufficient data    Nutrition Diagnosis: Predicted inadequate oral intake r/t dementia, constipation and associated decreased ability to consume sufficient oral intake as evidenced by reported decreased po intake and wt loss    Intervention:  Meals and snacks: Continue current diet. Nutrition Supplement Therapy: Continue Glucerna shake tid   Discharge nutrition plan: Too soon to determine.     Oleg Cevallos, [address], [facility]

## 2019-11-24 NOTE — PROGRESS NOTES
Visit with patient to build rapport with .   calm Leopoldo Antu,  Staff   C: [phone]  /  [email or web address]

## 2019-11-24 NOTE — PROGRESS NOTES
Problem: Falls - Risk of  Goal: *Absence of Falls  Description  Document Devere [place] Fall Risk and appropriate interventions in the flowsheet.   Outcome: Progressing Towards Goal  Note: Fall Risk Interventions:       Mentation Interventions: Adequate sleep, hydration, pain control, Bed/chair exit alarm, Door open when patient unattended, Increase mobility, More frequent rounding, Reorient patient, Toileting rounds, Update white board    Medication Interventions: Assess postural VS orthostatic hypotension, Bed/chair exit alarm, Patient to call before getting OOB, Teach patient to arise slowly    Elimination Interventions: Bed/chair exit alarm, Call light in reach, Patient to call for help with toileting needs, Stay With Me (per policy), Toileting schedule/hourly rounds, Toilet paper/wipes in reach    History of Falls Interventions: Bed/chair exit alarm, Door open when patient unattended         Problem: Patient Education: Go to Patient Education Activity  Goal: Patient/Family Education  Outcome: Progressing Towards Goal

## 2019-11-24 NOTE — PROGRESS NOTES
TRANSFER - IN REPORT:    Verbal report received from Jann Miles, [facility] (name) on Regi Mendoza  being received from ED (unit) for routine progression of care      Report consisted of patients Situation, Background, Assessment and   Recommendations(SBAR). Information from the following report(s) SBAR, Kardex, Intake/Output, MAR and Recent Results was reviewed with the receiving nurse. Opportunity for questions and clarification was provided. Assessment to be completed upon patients arrival to unit and care assumed.

## 2019-11-24 NOTE — PROGRESS NOTES
Milk and molasses enema given per MD orders. Pt. displayed immediate relief as a result. Large amount of stool passed. Pt. tolerated and resting comfortably.

## 2019-11-25 LAB
GLUCOSE BLD STRIP.AUTO-MCNC: 112 MG/DL (ref 65–100)
GLUCOSE BLD STRIP.AUTO-MCNC: 121 MG/DL (ref 65–100)
GLUCOSE BLD STRIP.AUTO-MCNC: 135 MG/DL (ref 65–100)
GLUCOSE BLD STRIP.AUTO-MCNC: 165 MG/DL (ref 65–100)

## 2019-11-25 PROCEDURE — 65270000029 HC RM PRIVATE

## 2019-11-25 PROCEDURE — 82962 GLUCOSE BLOOD TEST: CPT

## 2019-11-25 PROCEDURE — 94640 AIRWAY INHALATION TREATMENT: CPT

## 2019-11-25 PROCEDURE — 86580 TB INTRADERMAL TEST: CPT | Performed by: INTERNAL MEDICINE

## 2019-11-25 PROCEDURE — 74011250637 HC RX REV CODE- 250/637: Performed by: HOSPITALIST

## 2019-11-25 PROCEDURE — 74011636637 HC RX REV CODE- 636/637: Performed by: HOSPITALIST

## 2019-11-25 PROCEDURE — 74011250636 HC RX REV CODE- 250/636: Performed by: INTERNAL MEDICINE

## 2019-11-25 PROCEDURE — 74011000258 HC RX REV CODE- 258: Performed by: HOSPITALIST

## 2019-11-25 PROCEDURE — 94760 N-INVAS EAR/PLS OXIMETRY 1: CPT

## 2019-11-25 PROCEDURE — 74011000302 HC RX REV CODE- 302: Performed by: INTERNAL MEDICINE

## 2019-11-25 PROCEDURE — 74011250637 HC RX REV CODE- 250/637: Performed by: PHYSICIAN ASSISTANT

## 2019-11-25 PROCEDURE — 77030020263 HC SOL INJ SOD CL0.9% LFCR 1000ML

## 2019-11-25 PROCEDURE — 74011000250 HC RX REV CODE- 250: Performed by: HOSPITALIST

## 2019-11-25 PROCEDURE — 74011250636 HC RX REV CODE- 250/636: Performed by: HOSPITALIST

## 2019-11-25 RX ORDER — POTASSIUM CHLORIDE 14.9 MG/ML
20 INJECTION INTRAVENOUS ONCE
Status: COMPLETED | OUTPATIENT
Start: 2019-11-25 | End: 2019-11-25

## 2019-11-25 RX ADMIN — IPRATROPIUM BROMIDE AND ALBUTEROL SULFATE 3 ML: .5; 3 SOLUTION RESPIRATORY (INHALATION) at 07:19

## 2019-11-25 RX ADMIN — IPRATROPIUM BROMIDE AND ALBUTEROL SULFATE 3 ML: .5; 3 SOLUTION RESPIRATORY (INHALATION) at 19:44

## 2019-11-25 RX ADMIN — BUDESONIDE 500 MCG: 0.5 INHALANT RESPIRATORY (INHALATION) at 19:44

## 2019-11-25 RX ADMIN — POTASSIUM CHLORIDE 20 MEQ: 200 INJECTION, SOLUTION INTRAVENOUS at 09:11

## 2019-11-25 RX ADMIN — DOCUSATE SODIUM 100 MG: 100 CAPSULE, LIQUID FILLED ORAL at 09:14

## 2019-11-25 RX ADMIN — INSULIN LISPRO 2 UNITS: 100 INJECTION, SOLUTION INTRAVENOUS; SUBCUTANEOUS at 11:41

## 2019-11-25 RX ADMIN — ASPIRIN 325 MG ORAL TABLET 325 MG: 325 PILL ORAL at 09:14

## 2019-11-25 RX ADMIN — FAMOTIDINE 20 MG: 20 TABLET ORAL at 23:03

## 2019-11-25 RX ADMIN — SODIUM CHLORIDE 75 ML/HR: 900 INJECTION, SOLUTION INTRAVENOUS at 09:11

## 2019-11-25 RX ADMIN — Medication 10 ML: at 14:17

## 2019-11-25 RX ADMIN — IPRATROPIUM BROMIDE AND ALBUTEROL SULFATE 3 ML: .5; 3 SOLUTION RESPIRATORY (INHALATION) at 14:19

## 2019-11-25 RX ADMIN — CEFTRIAXONE 1 G: 1 INJECTION, POWDER, FOR SOLUTION INTRAMUSCULAR; INTRAVENOUS at 18:30

## 2019-11-25 RX ADMIN — DONEPEZIL HYDROCHLORIDE 5 MG: 5 TABLET, FILM COATED ORAL at 23:03

## 2019-11-25 RX ADMIN — ENOXAPARIN SODIUM 40 MG: 40 INJECTION SUBCUTANEOUS at 23:04

## 2019-11-25 RX ADMIN — MULTIPLE VITAMINS W/ MINERALS TAB 1 TABLET: TAB at 09:11

## 2019-11-25 RX ADMIN — CALCIUM CARBONATE 500 MG (1,250 MG)-VITAMIN D3 200 UNIT TABLET 1 TABLET: at 09:14

## 2019-11-25 RX ADMIN — Medication 10 ML: at 23:04

## 2019-11-25 RX ADMIN — PRAVASTATIN SODIUM 40 MG: 20 TABLET ORAL at 23:03

## 2019-11-25 RX ADMIN — BUDESONIDE 500 MCG: 0.5 INHALANT RESPIRATORY (INHALATION) at 07:19

## 2019-11-25 RX ADMIN — CALCIUM CARBONATE 500 MG (1,250 MG)-VITAMIN D3 200 UNIT TABLET 1 TABLET: at 17:22

## 2019-11-25 RX ADMIN — DILTIAZEM HYDROCHLORIDE 240 MG: 240 CAPSULE, COATED, EXTENDED RELEASE ORAL at 09:14

## 2019-11-25 RX ADMIN — QUETIAPINE FUMARATE 12.5 MG: 25 TABLET ORAL at 17:22

## 2019-11-25 RX ADMIN — TUBERCULIN PURIFIED PROTEIN DERIVATIVE 5 UNITS: 5 INJECTION, SOLUTION INTRADERMAL at 15:41

## 2019-11-25 NOTE — PROGRESS NOTES
Chart review completed, pt confused per notes, attempted to contact daughter Brando Squires at numbers provided in chart, message left for her to return call to CM, CM will remain available for DC needs. Pending PT evals at this time. Care Management Interventions  PCP Verified by CM:  Yes  Current Support Network: Own Home(lives with family)  Confirm Follow Up Transport: Family  Discharge Location  Discharge Placement: Unable to determine at this time

## 2019-11-25 NOTE — PROGRESS NOTES
Follow up visit to build relationship of connectedness, care & emotional / spiritual support.       __x____    active listening/ guided questions to understand pt's spiritual needs    _x_____    exploration of hope & the presence of God to normalize struggles as Holy, reframe, introduce coping skills    ____x___   prayer / blessing of pt & struggles, to convey peace & lessen anxiety     Additional  Comments / interventions:         Rajendra Bone, [facility], PhD  Karyle Canner

## 2019-11-25 NOTE — INTERDISCIPLINARY ROUNDS
Interdisciplinary team rounds were held 11/25/2019 with the following team members:Care Management, Nursing, Physical Therapy and Physician and the patient and daughter. Plan of care discussed. See clinical pathway and/or care plan for interventions and desired outcomes.

## 2019-11-25 NOTE — PROGRESS NOTES
Physical Therapy Note:    Physical therapy evaluation orders received and chart reviewed. Attempted to see patient this PM to initiate assessment. Patient currently with another provider. Will follow and re-attempt at a later time/date as schedule permits/patient available.  Thank you,    Raina Colón, PT, DPT  11/25/19

## 2019-11-25 NOTE — PROGRESS NOTES
Hospitalist Progress Note    2019  Admit Date: 2019  2:56 PM   NAME: Kaitlyn Parra   :  1926   DOS:              19  MRN:  [id number]   Attending: Jyoti Mohan MD  PCP:  Ronel Jeffries MD  Treatment Team: Attending Provider: Ashvin Walls MD; Charge Nurse: Roberto Carlos Staton; Utilization Review: Vasu Vallejo RN; Care Manager: Macario Casey RN; Care Manager: Mami Tapia RN    Full Code     SUBJECTIVE:   As previously documented: 81 y/o female with hx dementia, asthma, HTN, CAD, CVA, DM who arrives to ED on  via EMS with increased confusion, AMS and LLQ abdominal pain. Pt with evidence of severe constipation on CT A/P and questionable UTI on UA.       19    Kaitlyn Parra is pleasantly confused lying in the bed. Patient denies any pain. Daughter at bedside reported her mental statu improved. Daughter reported PCP started her on new medication at home for agitation and sundowning. 10+ ROS reviewed and negative except for positive in HPI. Allergies   Allergen Reactions    Codeine Unknown (comments)     \"Makes her feel funny\"    Other Medication Other (comments)     sts lots of medicines make her sick.      Current Facility-Administered Medications   Medication Dose Route Frequency    tuberculin injection 5 Units  5 Units IntraDERMal ONCE    QUEtiapine (SEROquel) tablet 12.5 mg  12.5 mg Oral QPM    cefTRIAXone (ROCEPHIN) 1 g in 0.9% sodium chloride (MBP/ADV) 50 mL  1 g IntraVENous Q24H    budesonide (PULMICORT) 500 mcg/2 ml nebulizer suspension  500 mcg Nebulization BID RT    calcium-vitamin D (OS-JANENE) 500 mg-200 unit tablet  1 Tab Oral BID WITH MEALS    albuterol-ipratropium (DUO-NEB) 2.5 MG-0.5 MG/3 ML  3 mL Nebulization TID RT    famotidine (PEPCID) tablet 20 mg  20 mg Oral QHS    pravastatin (PRAVACHOL) tablet 40 mg  40 mg Oral QHS    multivitamin, tx-iron-ca-min (THERA-M w/ IRON) tablet 1 Tab  1 Tab Oral DAILY    donepezil (ARICEPT) tablet 5 mg  5 mg Oral QHS    docusate sodium (COLACE) capsule 100 mg  100 mg Oral DAILY    dilTIAZem CD (CARDIZEM CD) capsule 240 mg  240 mg Oral DAILY    diclofenac (VOLTAREN) 1 % topical gel 4 g (Patient Supplied)  4 g Topical QID    cyanocobalamin tablet 1,000 mcg  1,000 mcg Oral Q48H    aspirin tablet 325 mg  325 mg Oral DAILY    dextrose 40% (GLUTOSE) oral gel 1 Tube  15 g Oral PRN    glucagon (GLUCAGEN) injection 1 mg  1 mg IntraMUSCular PRN    dextrose (D50W) injection syrg 12.5-25 g  25-50 mL IntraVENous PRN    insulin lispro (HUMALOG) injection   SubCUTAneous AC&HS    sodium chloride (NS) flush 5-40 mL  5-40 mL IntraVENous Q8H    sodium chloride (NS) flush 5-40 mL  5-40 mL IntraVENous PRN    acetaminophen (TYLENOL) tablet 650 mg  650 mg Oral Q4H PRN    bisacodyl (DULCOLAX) tablet 5 mg  5 mg Oral DAILY PRN    enoxaparin (LOVENOX) injection 40 mg  40 mg SubCUTAneous Q24H    0.9% sodium chloride infusion  75 mL/hr IntraVENous CONTINUOUS         Immunization History   Administered Date(s) Administered    (RETIRED) Pneumococcal Vaccine (Unspecified Type) 10/29/2010    Influenza High Dose Vaccine PF 09/07/2016, 11/21/2017, 11/20/2018    Influenza Vaccine 09/01/2012, 10/02/2013, 09/02/2014    Influenza Vaccine (Tri) Adjuvanted 11/12/2019    Influenza Vaccine PF 10/07/2015    Influenza Vaccine Split 10/29/2010    Influenza Vaccine Whole 10/06/2012    Pneumococcal Conjugate (PCV-13) 06/17/2015    Pneumococcal Vaccine (Pcv) 01/11/2007    Pneumococcal Vaccine (Unspecified Type) 01/01/2011    Tdap 10/07/2015     Objective:     Patient Vitals for the past 24 hrs:   Temp Pulse Resp BP SpO2   11/25/19 1421     94 %   11/25/19 1122 97.9 °F (36.6 °C) 99 20 126/81 98 %   11/25/19 0754 97.8 °F (36.6 °C) 100 20 (!) 137/93 99 %   11/25/19 0719     100 %   11/25/19 0501 98.9 °F (37.2 °C) 83 20 (!) 154/95 92 %   11/25/19 0015 98.4 °F (36.9 °C) 95 20 (!) 162/92 91 %   11/24/19 1937 98.2 °F (36.8 °C) 97 18 (!) 170/95 96 %   19 192     96 %   19 1657 97.9 °F (36.6 °C) 95 20 143/82 97 %     Temp (24hrs), Av.2 °F (36.8 °C), Min:97.8 °F (36.6 °C), Max:98.9 °F (37.2 °C)    Oxygen Therapy  O2 Sat (%): 94 % (19 142)  Pulse via Oximetry: 112 beats per minute (19 1421)  O2 Device: Room air (19 142)  Oxygen Therapy  O2 Sat (%): 94 % (19 142)  Pulse via Oximetry: 112 beats per minute (19 142)  O2 Device: Room air (19 142)    Physical Exam:  General:         Alert, cooperative, no distress   Lungs: No wheezing/rhonchi/rales  Cardiovascular:   RRR. No m/r/g. No pedal edema b/l. Abdomen:       S/nt/nd. Bowel sounds normal.    Skin:         No rashes or lesions. Not Jaundiced  Neurologic:    AAOx name only. Following some simple commands. No gross focal deficits  Psychiatric:         Good mood. Normal affect.                DIAGNOSTIC STUDIES      Data Review:   Recent Results (from the past 24 hour(s))   GLUCOSE, POC    Collection Time: 19  4:48 PM   Result Value Ref Range    Glucose (POC) 145 (H) 65 - 100 mg/dL   GLUCOSE, POC    Collection Time: 19  9:07 PM   Result Value Ref Range    Glucose (POC) 141 (H) 65 - 100 mg/dL   GLUCOSE, POC    Collection Time: 19  7:29 AM   Result Value Ref Range    Glucose (POC) 121 (H) 65 - 100 mg/dL   GLUCOSE, POC    Collection Time: 19 11:05 AM   Result Value Ref Range    Glucose (POC) 165 (H) 65 - 100 mg/dL       All Micro Results     Procedure Component Value Units Date/Time    CULTURE, URINE [[id number]] Collected:  19 1618    Order Status:  Completed Specimen:  Urine from Clean catch Updated:  19 2041     Special Requests: NO SPECIAL REQUESTS        Culture result: NO GROWTH 1 DAY       CULTURE, URINE [[id number]]     Order Status:  Canceled Specimen:  Urine from Clean catch           Imaging /Procedures /Studies:    CXR Results  (Last 48 hours) 11/24/19 1835  XR CHEST SNGL V Final result    Impression:  IMPRESSION: No acute abnormality       Narrative:  Portable AP supine chest dated 11/24/2019 at 1828 hours       Prior chest x-ray 4/4/2013       CLINICAL INFORMATION: Leukocytosis, confusion       Heart is enlarged. Mediastinum unremarkable. Lungs are clear. No pleural   effusion. CT Results  (Last 48 hours)               11/24/19 1635  CT HEAD WO CONT Final result    Impression:  IMPRESSION:       No acute intracranial abnormality within the limits of patient motion. VOICE DICTATED BY: Dr. Stoney Oviedo           Narrative:  EXAMINATION: HEAD CT WITHOUT CONTRAST 11/24/2019 4:35 PM       ACCESSION NUMBER: [id number]       INDICATION: Focal neuro deficit, new, fixed or worsening, >24 hours, new onset   altered mental status       COMPARISON: Head CT 6/15/2011       TECHNIQUE: Multiple-row detector helical CT examination of the head without   intravenous contrast.        Radiation dose reduction techniques were used for this study:  Our CT scanners   use one or all of the following: Automated exposure control, adjustment of the   mA and/or kVp according to patient's size, iterative reconstruction. FINDINGS:       The ventricles are within normal limits for the degree of global brain   parenchymal volume loss. There is no midline shift. The basilar cisterns are   patent. There is no cerebellar tonsillar ectopia or herniation. Hypodensities in the periventricular and subcortical white matter are   nonspecific, but they are most likely to represent chronic microangiopathy. Prior bilateral lens replacement. There is no evidence of acute intracranial hemorrhage or extra-axial   collections. There is no CT evidence of acute infarction. Minimal fluid in the left sphenoid sinus.            11/23/19 1802  CT ABD PELV W CONT Final result    Impression:  IMPRESSION:    No definite acute pathology identified, however there is significant motion   artifact. Stool throughout the colon and rectum. Other incidental findings as   above. Narrative:  CT ABDOMEN AND PELVIS WITH CONTRAST       HISTORY: Abd pain, 80 years Female dementia and has been more altered then   normal. Guarding left lower abdominal area. Not tender with palpation       COMPARISON: None available       TECHNIQUE: Oral contrast was not administered. 100 cc of nonionic intravenous   contrast was injected, and axial helical CT images were obtained from above the   diaphragm through the pelvis. Coronal reformatted images were obtained at the   scanner console and made available for review. All CT scans at this location   are performed using dose modulation techniques as appropriate to a performed   exam including the following: automated exposure control; adjustment of the mA   and/or kV according to patient's size (this includes techniques or standardized   protocols for targeted exams where dose is matched to indication/reason for   exam; i.e. extremities or head); use of iterative reconstruction technique. FINDINGS:       ABDOMEN:   Minimal dependent subsegmental atelectasis bilateral lung bases. Mild diffuse   intrahepatic and extrahepatic biliary ductal dilatation, which may be secondary   to post cholecystectomy state. Mild diffuse atrophy of the pancreas. Normal-appearing spleen, bilateral adrenal glands and left kidney. Small simple   appearing right renal cortical cyst.  Severe calcific atherosclerosis of a   normal caliber abdominal aorta. No evidence of significant lymphadenopathy. Normal-appearing small bowel. No evidence of intraperitoneal free air or free   fluid. Image quality somewhat degraded by motion artifact, despite repeat   imaging. PELVIS:   Normal-appearing urinary bladder. Normal-appearing atrophic uterus and   bilateral adnexa. Large stool ball in the rectum.   Stool is also seen   throughout the colon. Appendix not visualized in the absence of oral contrast   administration and significant motion artifact. No evidence of pelvic free   fluid. No evidence of significant inguinal or pelvic sidewall lymphadenopathy. Visualized osseous structures unremarkable. Xr Chest Sngl V    Result Date: 11/24/2019  IMPRESSION: No acute abnormality    Ct Head Wo Cont    Result Date: 11/24/2019  IMPRESSION: No acute intracranial abnormality within the limits of patient motion. VOICE DICTATED BY: Dr. Crystal Ramos     No results found for this visit on 11/23/19.     Labs and Studies from previous 24 hours have been personally reviewed by myself [place] Problems    Diagnosis Date Noted    Acute encephalopathy 11/23/2019    Acute UTI (urinary tract infection) 11/23/2019    CAD (coronary artery disease) 11/23/2019    Abdominal pain 11/23/2019    Dementia ([facility] 11/23/2019    [facility] with [facility] 05/21/2018    Essential hypertension with goal blood pressure less than 140/90 05/31/2016    Constipation 06/17/2015    Atrial fibrillation ([facility]     Asthma-chronic obstructive pulmonary disease overlap syndrome ([facility] 06/27/2013     Hospital Problems as of 11/25/2019 Date Reviewed: 8/20/2018          Codes Class Noted - Resolved POA    * (Principal) Acute encephalopathy ICD-10-CM: G93.40  ICD-9-CM: 348.30  11/23/2019 - Present Yes        Acute UTI (urinary tract infection) ICD-10-CM: N39.0  ICD-9-CM: 599.0  11/23/2019 - Present Yes        CAD (coronary artery disease) ICD-10-CM: I25.10  ICD-9-CM: 414.00  11/23/2019 - Present Yes        Abdominal pain ICD-10-CM: R10.9  ICD-9-CM: 789.00  11/23/2019 - Present Yes        Dementia ([facility] ICD-10-CM: F03.90  ICD-9-CM: 294.20  11/23/2019 - Present Yes        Type 2 diabetes with nephropathy ([facility] ICD-10-CM: E11.21  ICD-9-CM: 250.40, 583.81  5/21/2018 - Present Yes        Essential hypertension with goal blood pressure less than 140/90 ICD-10-CM: I10  ICD-9-CM: 401.9  5/31/2016 - Present Yes        Atrial fibrillation ([address] ICD-10-CM: I48.91  ICD-9-CM: 427.31  Unknown - Present Yes        Constipation ICD-10-CM: K59.00  ICD-9-CM: 564.00  6/17/2015 - Present Yes        Asthma-chronic obstructive pulmonary disease overlap syndrome (HCC) ICD-10-CM: J44.9  ICD-9-CM: 493.20  6/27/2013 - Present Yes              A/P:    -Acute metabolic encephalopathy  Could be related to UTI vs constipation vs medication induced  Patient could be already  around her baseline  CXR with no acute findings  Ucx no growth day #1  Patient had several bowel movements overnight- constipation resolved  Will ask PT/OT to evaluate  Cont ceftriaxone for suspected UTI    -DM  Overall controlled  Cont ISS    -Asthma  No active wheezing  Cont home meds    -Afib   Rate control on cardizem  Likely not on AC at home due to high risk of falls/age      DVT Prophylaxis: lovenox  CODE Status: Full      Jenny Best MD  11/25/19

## 2019-11-25 NOTE — PROGRESS NOTES
Met with daughter during IDT rounds and shared that CM would follow up afterward. Met with patient and daughter at bedside. Patient oriented to self only. Patient has own one level home and daughter resides with her. Patient is assist with all ADLs. Patient has rolling walker and BSC at home. Confirmed demographics, insurance and PCP. PT eval pending and CM will follow for recommendations of discharge needs. PPD ordered in case rehab needed at discharge. CM will continue to follow for discharge needs. Care Management Interventions  PCP Verified by CM:  Yes  Physical Therapy Consult: Yes  Current Support Network: Own Home(lives with family)  Confirm Follow Up Transport: Family  Plan discussed with Pt/Family/Caregiver: Yes  Freedom of Choice Offered: Yes  Discharge Location  Discharge Placement: Unable to determine at this time     SHANICE Armas

## 2019-11-26 ENCOUNTER — HOME HEALTH ADMISSION (OUTPATIENT)
Dept: HOME HEALTH SERVICES | Facility: HOME HEALTH | Age: 84
End: 2019-11-26
Payer: MEDICARE

## 2019-11-26 VITALS
BODY MASS INDEX: 19.83 KG/M2 | SYSTOLIC BLOOD PRESSURE: 118 MMHG | RESPIRATION RATE: 19 BRPM | TEMPERATURE: 98.6 F | HEART RATE: 86 BPM | OXYGEN SATURATION: 95 % | DIASTOLIC BLOOD PRESSURE: 78 MMHG | WEIGHT: 115.5 LBS

## 2019-11-26 LAB
ANION GAP SERPL CALC-SCNC: 8 MMOL/L (ref 7–16)
BACTERIA SPEC CULT: NORMAL
BASOPHILS # BLD: 0.1 K/UL (ref 0–0.2)
BASOPHILS NFR BLD: 1 % (ref 0–2)
BUN SERPL-MCNC: 12 MG/DL (ref 8–23)
CALCIUM SERPL-MCNC: 8.9 MG/DL (ref 8.3–10.4)
CHLORIDE SERPL-SCNC: 101 MMOL/L (ref 98–107)
CO2 SERPL-SCNC: 29 MMOL/L (ref 21–32)
CREAT SERPL-MCNC: 0.66 MG/DL (ref 0.6–1)
DIFFERENTIAL METHOD BLD: ABNORMAL
EOSINOPHIL # BLD: 0.2 K/UL (ref 0–0.8)
EOSINOPHIL NFR BLD: 2 % (ref 0.5–7.8)
ERYTHROCYTE [DISTWIDTH] IN BLOOD BY AUTOMATED COUNT: 14.2 % (ref 11.9–14.6)
GLUCOSE BLD STRIP.AUTO-MCNC: 102 MG/DL (ref 65–100)
GLUCOSE BLD STRIP.AUTO-MCNC: 124 MG/DL (ref 65–100)
GLUCOSE BLD STRIP.AUTO-MCNC: 165 MG/DL (ref 65–100)
GLUCOSE SERPL-MCNC: 140 MG/DL (ref 65–100)
HCT VFR BLD AUTO: 41.5 % (ref 35.8–46.3)
HGB BLD-MCNC: 13.7 G/DL (ref 11.7–15.4)
IMM GRANULOCYTES # BLD AUTO: 0 K/UL (ref 0–0.5)
IMM GRANULOCYTES NFR BLD AUTO: 0 % (ref 0–5)
LYMPHOCYTES # BLD: 1.2 K/UL (ref 0.5–4.6)
LYMPHOCYTES NFR BLD: 10 % (ref 13–44)
MCH RBC QN AUTO: 29.5 PG (ref 26.1–32.9)
MCHC RBC AUTO-ENTMCNC: 33 G/DL (ref 31.4–35)
MCV RBC AUTO: 89.4 FL (ref 79.6–97.8)
MM INDURATION POC: 0 MM (ref 0–5)
MONOCYTES # BLD: 0.9 K/UL (ref 0.1–1.3)
MONOCYTES NFR BLD: 7 % (ref 4–12)
NEUTS SEG # BLD: 9.4 K/UL (ref 1.7–8.2)
NEUTS SEG NFR BLD: 80 % (ref 43–78)
NRBC # BLD: 0 K/UL (ref 0–0.2)
PLATELET # BLD AUTO: 306 K/UL (ref 150–450)
PMV BLD AUTO: 10 FL (ref 9.4–12.3)
POTASSIUM SERPL-SCNC: 3.2 MMOL/L (ref 3.5–5.1)
PPD POC: NEGATIVE NEGATIVE
RBC # BLD AUTO: 4.64 M/UL (ref 4.05–5.2)
SERVICE CMNT-IMP: NORMAL
SODIUM SERPL-SCNC: 138 MMOL/L (ref 136–145)
WBC # BLD AUTO: 11.7 K/UL (ref 4.3–11.1)

## 2019-11-26 PROCEDURE — 80048 BASIC METABOLIC PNL TOTAL CA: CPT

## 2019-11-26 PROCEDURE — 94640 AIRWAY INHALATION TREATMENT: CPT

## 2019-11-26 PROCEDURE — 74011250636 HC RX REV CODE- 250/636: Performed by: INTERNAL MEDICINE

## 2019-11-26 PROCEDURE — 74011250637 HC RX REV CODE- 250/637: Performed by: HOSPITALIST

## 2019-11-26 PROCEDURE — 74011636637 HC RX REV CODE- 636/637: Performed by: HOSPITALIST

## 2019-11-26 PROCEDURE — 94760 N-INVAS EAR/PLS OXIMETRY 1: CPT

## 2019-11-26 PROCEDURE — 74011000250 HC RX REV CODE- 250: Performed by: HOSPITALIST

## 2019-11-26 PROCEDURE — 82962 GLUCOSE BLOOD TEST: CPT

## 2019-11-26 PROCEDURE — 97161 PT EVAL LOW COMPLEX 20 MIN: CPT

## 2019-11-26 PROCEDURE — 36415 COLL VENOUS BLD VENIPUNCTURE: CPT

## 2019-11-26 PROCEDURE — 97116 GAIT TRAINING THERAPY: CPT

## 2019-11-26 PROCEDURE — 74011250637 HC RX REV CODE- 250/637: Performed by: PHYSICIAN ASSISTANT

## 2019-11-26 PROCEDURE — 85025 COMPLETE CBC W/AUTO DIFF WBC: CPT

## 2019-11-26 PROCEDURE — 97165 OT EVAL LOW COMPLEX 30 MIN: CPT

## 2019-11-26 PROCEDURE — 97535 SELF CARE MNGMENT TRAINING: CPT

## 2019-11-26 RX ORDER — POTASSIUM CHLORIDE 20 MEQ/1
40 TABLET, EXTENDED RELEASE ORAL
Status: COMPLETED | OUTPATIENT
Start: 2019-11-26 | End: 2019-11-26

## 2019-11-26 RX ORDER — QUETIAPINE FUMARATE 25 MG/1
12.5 TABLET, FILM COATED ORAL EVERY EVENING
Qty: 15 TAB | Refills: 0 | Status: SHIPPED | OUTPATIENT
Start: 2019-11-26 | End: 2019-12-03 | Stop reason: SDUPTHER

## 2019-11-26 RX ADMIN — MULTIPLE VITAMINS W/ MINERALS TAB 1 TABLET: TAB at 08:49

## 2019-11-26 RX ADMIN — INSULIN LISPRO 2 UNITS: 100 INJECTION, SOLUTION INTRAVENOUS; SUBCUTANEOUS at 11:02

## 2019-11-26 RX ADMIN — QUETIAPINE FUMARATE 12.5 MG: 25 TABLET ORAL at 17:03

## 2019-11-26 RX ADMIN — DILTIAZEM HYDROCHLORIDE 240 MG: 240 CAPSULE, COATED, EXTENDED RELEASE ORAL at 08:49

## 2019-11-26 RX ADMIN — BUDESONIDE 500 MCG: 0.5 INHALANT RESPIRATORY (INHALATION) at 07:37

## 2019-11-26 RX ADMIN — ACETAMINOPHEN 650 MG: 325 TABLET, FILM COATED ORAL at 13:03

## 2019-11-26 RX ADMIN — SODIUM CHLORIDE 75 ML/HR: 900 INJECTION, SOLUTION INTRAVENOUS at 11:46

## 2019-11-26 RX ADMIN — CALCIUM CARBONATE 500 MG (1,250 MG)-VITAMIN D3 200 UNIT TABLET 1 TABLET: at 08:49

## 2019-11-26 RX ADMIN — ASPIRIN 325 MG ORAL TABLET 325 MG: 325 PILL ORAL at 08:49

## 2019-11-26 RX ADMIN — POTASSIUM CHLORIDE 40 MEQ: 20 TABLET, EXTENDED RELEASE ORAL at 13:56

## 2019-11-26 RX ADMIN — CYANOCOBALAMIN TAB 1000 MCG 1000 MCG: 1000 TAB at 08:49

## 2019-11-26 RX ADMIN — CALCIUM CARBONATE 500 MG (1,250 MG)-VITAMIN D3 200 UNIT TABLET 1 TABLET: at 17:03

## 2019-11-26 RX ADMIN — Medication 10 ML: at 06:15

## 2019-11-26 RX ADMIN — DOCUSATE SODIUM 100 MG: 100 CAPSULE, LIQUID FILLED ORAL at 08:50

## 2019-11-26 RX ADMIN — IPRATROPIUM BROMIDE AND ALBUTEROL SULFATE 3 ML: .5; 3 SOLUTION RESPIRATORY (INHALATION) at 07:37

## 2019-11-26 RX ADMIN — Medication 5 ML: at 13:05

## 2019-11-26 RX ADMIN — IPRATROPIUM BROMIDE AND ALBUTEROL SULFATE 3 ML: .5; 3 SOLUTION RESPIRATORY (INHALATION) at 13:36

## 2019-11-26 NOTE — PROGRESS NOTES
Problem: Patient Education: Go to Patient Education Activity  Goal: Patient/Family Education  Description  1. Patient will complete lower body bathing and dressing with SBA and adaptive equipment as needed. 2. Patient will complete toileting with SBA. 3. Patient will tolerate 23 minutes of OT treatment with 1-2 rest breaks to increase activity tolerance for ADLs. 4. Patient will complete functional transfers with CGA and adaptive equipment as needed. 5. Patient will complete functional mobility for household distances with CGA and adaptive equipment as needed. Timeframe: 7 visits      Outcome: Progressing Towards Goal      OCCUPATIONAL THERAPY: Initial Assessment, Daily Note, and AM 11/26/2019  INPATIENT: OT Visit Days: 1  Payor: Adolfo Mccabe / Plan: [employer] [address] / Product Type: [employer] /      NAME/AGE/GENDER: Milan Speaker is a 80 y.o. female   PRIMARY DIAGNOSIS:  Acute encephalopathy [G93.40]  Acute UTI (urinary tract infection) [N39.0] Acute encephalopathy Acute encephalopathy        ICD-10: Treatment Diagnosis:    Generalized Muscle Weakness (M62.81)   Precautions/Allergies:    Fall precautions   Codeine and Other medication      ASSESSMENT:     Ms. Moss Medicine presents for acute encephalopathy with UTI. Upon arrival, pt supine in bed with visitors at bedside. Pt is alert, however only oriented to person today. D/t pt's increased confusion social and functional background information limited, however per chart, pt was living with daughter in a 1-story home. At baseline, pt required assistance with bathing and dressing ADLs and was ambulating with a RW. Today, pt performed supine to sit transfer to edge of bed with min A x 2. Static sitting balance intact, however requires occasional support to maintain unsupported dynamic balance. Pt completed sit to stand transfer with min A x 2 and ambulated ~5 steps to [facility] with min A x RW.  Pt required total A for toileting hygiene this session, however believe this was due to pt's increased confusion and decreased command following. Upon completion, pt ambulated ~15 ft in room to bedside chair with min A x RW; slight forward flexion noted during mobility despite verbal cueing. BUE AROM WFL; strength generally decreased to 4/5 in BUE; coordination and sensation are intact. Pt left sitting upright in bedside chair with needs met, call light within reach, visitors at bedside, posey alarm on, and lap belt in place. At this time, Jarett Crook is functioning below baseline for ADLs and functional mobility. PT would benefit from skilled OT services to address OT goals and plan of care. This section established at most recent assessment   PROBLEM LIST (Impairments causing functional limitations):  Decreased Strength  Decreased ADL/Functional Activities  Decreased Transfer Abilities  Decreased Ambulation Ability/Technique  Decreased Balance  Decreased Activity Tolerance  Decreased Cognition   INTERVENTIONS PLANNED: (Benefits and precautions of occupational therapy have been discussed with the patient.)  Activities of daily living training  Adaptive equipment training  Balance training  Clothing management  Cognitive training  Community reintergration  Neuromuscular re-eduation  Re-evaluation  Therapeutic activity  Therapeutic exercise     TREATMENT PLAN: Frequency/Duration: Follow patient 3x/week to address above goals. Rehabilitation Potential For Stated Goals: Good     REHAB RECOMMENDATIONS (at time of discharge pending progress):    Placement: It is my opinion, based on this patient's performance to date, that Ms. Froy Garcia may benefit from participating in 1-2 additional therapy sessions in order to continue to assess for rehab potential and then make recommendation for disposition at discharge.   Equipment:   TBD               OCCUPATIONAL PROFILE AND HISTORY:   History of Present Injury/Illness (Reason for Referral):  See H&P  Past Medical History/Comorbidities:   Ms. Savanah Weathers  has a past medical history of Acute UTI (urinary tract infection) (11/23/2019), Anxiety, Asthma, Atrial fibrillation ([address] Atypical ductal hyperplasia of breast, Back pain, CAD (coronary artery disease), Constipation (6/17/2015), CVA (cerebral vascular accident) ([address] (10/29/2010), CVA (cerebral vascular accident) ([address] (10/29/2010), DM (diabetes mellitus) ([address] DM (diabetes mellitus) ([address] Osteoarthrosis, unspecified whether generalized or localized, hand (9/4/2013), and Pain in joint, lower leg (9/4/2013). She also has no past medical history of Autoimmune disease ([address] Cancer ([address] Chronic kidney disease, COPD, Dementia, Heart failure ([address] Liver disease, Other ill-defined conditions(799.89), Psychiatric disorder, PUD (peptic ulcer disease), Seizures ([address] Sleep disorder, or Thromboembolus ([address] Ms. Savanah Weathers  has a past surgical history that includes hx cholecystectomy; hx other surgical; trigger point injection; hx carpal tunnel release; hx cataract removal (2005); hx cholecystectomy; and hx retinal detachment repair. Social History/Living Environment:   Home Environment: Private residence  # Steps to Enter: 0  One/Two Story Residence: One story  Living Alone: No  Support Systems: Child(janet), Friends \ neighbors  Patient Expects to be Discharged to[de-identified] Unknown  Current DME Used/Available at Home: Walker, rolling, Commode, bedside  Prior Level of Function/Work/Activity:  Assist with bathing and dressing; uses RW for mobility. Personal Factors:          Sex:  female        Age:  80 y.o.         Other factors that influence how disability is experienced by the patient:  multiple co-morbidities    Number of Personal Factors/Comorbidities that affect the Plan of Care: Brief history (0):  LOW COMPLEXITY   ASSESSMENT OF OCCUPATIONAL PERFORMANCE[de-identified]   Activities of Daily Living:   Basic ADLs (From Assessment) Complex ADLs (From Assessment)   Feeding: Setup  Oral Facial Hygiene/Grooming: Setup  Bathing: Moderate assistance  Upper Body Dressing: Minimum assistance  Lower Body Dressing: Moderate assistance  Toileting: Moderate assistance Instrumental ADL  Meal Preparation: Total assistance  Homemaking: Total assistance  Medication Management: Total assistance  Financial Management: Total assistance   Grooming/Bathing/Dressing Activities of Daily Living     Cognitive Retraining  Safety/Judgement: Decreased awareness of environment;Decreased awareness of need for assistance;Decreased awareness of need for safety;Decreased insight into deficits; Fall prevention                 Functional Transfers  Toilet Transfer : Minimum assistance;Assist x2     Bed/Mat Mobility  Rolling: Minimum assistance  Supine to Sit: Minimum assistance; Moderate assistance  Sit to Stand: Minimum assistance;Assist x2  Stand to Sit: Minimum assistance;Assist x2  Bed to Chair: Minimum assistance;Assist x2  Scooting: Moderate assistance     Most Recent Physical Functioning:   Gross Assessment:  AROM: Generally decreased, functional(BUEs)  Strength: Generally decreased, functional  Coordination: Within functional limits               Posture:     Balance:  Sitting: Intact  Standing: Impaired  Standing - Static: Fair  Standing - Dynamic : Fair Bed Mobility:  Rolling: Minimum assistance  Supine to Sit: Minimum assistance; Moderate assistance  Scooting:  Moderate assistance  Wheelchair Mobility:     Transfers:  Sit to Stand: Minimum assistance;Assist x2  Stand to Sit: Minimum assistance;Assist x2  Bed to Chair: Minimum assistance;Assist x2            Patient Vitals for the past 6 hrs:   BP BP Patient Position SpO2 Pulse   11/26/19 0737 -- -- 98 % --   11/26/19 0817 165/84 At rest 94 % (!) 104       Mental Status  Neurologic State: Alert, Confused  Orientation Level: Oriented to person, Disoriented to place, Disoriented to situation, Disoriented to time  Cognition: Decreased attention/concentration, Decreased command following  Perception: Tactile, Verbal, Visual  Perseveration: Tactile cues provided, Verbal cues provided, Visual cues provided  Safety/Judgement: Decreased awareness of environment, Decreased awareness of need for assistance, Decreased awareness of need for safety, Decreased insight into deficits, Fall prevention                          Physical Skills Involved:  Balance  Strength  Activity Tolerance  Gross Motor Control Cognitive Skills Affected (resulting in the inability to perform in a timely and safe manner):  Perception  Executive Function  Immediate Memory  Short Term Recall  Long Term Memory Psychosocial Skills Affected:  Habits/Routines  Environmental Adaptation   Number of elements that affect the Plan of Care: 5+:  HIGH COMPLEXITY   CLINICAL DECISION MAKIN[address] [place] [address] AM-PAC 6 Clicks   Daily Activity Inpatient Short Form  How much help from another person does the patient currently need. .. Total A Lot A Little None   1. Putting on and taking off regular lower body clothing? [] 1   [x] 2   [] 3   [] 4   2. Bathing (including washing, rinsing, drying)? [] 1   [x] 2   [] 3   [] 4   3. Toileting, which includes using toilet, bedpan or urinal?   [] 1   [x] 2   [] 3   [] 4   4. Putting on and taking off regular upper body clothing? [] 1   [] 2   [x] 3   [] 4   5. Taking care of personal grooming such as brushing teeth? [] 1   [] 2   [x] 3   [] 4   6. Eating meals? [] 1   [] 2   [x] 3   [] 4   © , Trustees of [address] [place] [address], under license to [email or web address]. All rights reserved      Score:  Initial: 15 Most Recent: X (Date: -- )    Interpretation of Tool:  Represents activities that are increasingly more difficult (i.e. Bed mobility, Transfers, Gait). Medical Necessity:     Patient demonstrates   good   rehab potential due to higher previous functional level.   Reason for Services/Other Comments:  Patient continues to require skilled intervention due to   medical complications and patient unable to attend/participate in therapy as expected  . Use of outcome tool(s) and clinical judgement create a POC that gives a: LOW COMPLEXITY         TREATMENT:   (In addition to Assessment/Re-Assessment sessions the following treatments were rendered)     Pre-treatment Symptoms/Complaints:  \"I'm a little sore today. \"  Pain: Initial:   Pain Intensity 1: 0/10 Post Session:  same     Self Care: (10 minutes): Procedure(s) (per grid) utilized to improve and/or restore self-care/home management as related to toileting. Required maximal visual, verbal, manual, tactile, and   cueing to facilitate activities of daily living skills. Braces/Orthotics/Lines/Etc:   O2 Device: Room air  Treatment/Session Assessment:    Response to Treatment:  tolerated well with no issues noted. Interdisciplinary Collaboration:   Physical Therapist  Occupational Therapist  Registered Nurse  After treatment position/precautions:   Up in chair  Bed alarm/tab alert on  Bed/Chair-wheels locked  Bed in low position  Call light within reach  RN notified  Visitors at bedside  Lap belt in place   Compliance with Program/Exercises: Will assess as treatment progresses. Recommendations/Intent for next treatment session: \"Next visit will focus on advancements to more challenging activities and reduction in assistance provided\".   Total Treatment Duration:  OT Patient Time In/Time Out  Time In: 0913  Time Out: 1000 [facility]

## 2019-11-26 NOTE — PROGRESS NOTES
Problem: Mobility Impaired (Adult and Pediatric)  Goal: *Acute Goals and Plan of Care (Insert Text)  Description  ACUTE PT GOALS:  1. Patient will perform bed mobility with SUPERVISION within 7 days. 2. Patient will transfer bed to chair with CONTACT GUARD ASSISTANCE within 7 days. 3. Patient will demonstrate FAIR DYNAMIC STANDING balance within 7 day(s). 4. Patient will ambulate 50+ using least restrictive assistive device and CONTACT GUARD ASSISTANCE within 7 days. 5. Patient will tolerate 25+ minutes of therapeutic activity/exercise and/or neuromuscular re-education while maintaining stable vitals to improve functional strength and activity tolerance within 7 days. Outcome: Progressing Towards Goal       PHYSICAL THERAPY: Initial Assessment, Daily Note, and AM 11/26/2019  INPATIENT: PT Visit Days : 1  Payor: Reed Perkins / Plan: [employer] / Product Type: [employer] Medicare /       NAME/AGE/GENDER: Allison Gallardo is a 80 y.o. female   PRIMARY DIAGNOSIS: Acute encephalopathy [G93.40]  Acute UTI (urinary tract infection) [N39.0] Acute encephalopathy Acute encephalopathy        ICD-10: Treatment Diagnosis:    Generalized Muscle Weakness (M62.81)  Difficulty in walking, Not elsewhere classified (R26.2)   Precaution/Allergies:  Codeine and Other medication      ASSESSMENT:     Ms. Stacie Weller is a 80year old female admitted with acute metabolic encephalopathy. Patient seen this AM for initial physical therapy evaluation: presents supine in bed, oriented to person only (patient's baseline) and endorses 0/10 pain. History provided by chart and visitor at bedside: Ms. Víctor Sanz lives with her daughter where she requires assistance with ADLs and ambulates short household level distances with a RW. Today, functional weakness appreciated B LE R>L, B LE proximal ROM limited, and sensation is intact to light touch B LE.  Patient required Minimal assistance with bed mobility, transfers, and ambulation x15ft within room. Gait training initiated; patient required minimal assistance for safe RW negotiation, dynamic standing balance, and gait mechanics. Audible grinding in hips with ambulation. Patient demonstrated a slow, step-to gait pattern with narrowed base of support and fair dynamic standing balance. Ms. Jaxon Kimball presents with decreased functional activity tolerance secondary to acute illness. Recommend continued PT services to address stated deficits (HHPT). Will follow and progress toward stated goals during acute stay. This section established at most recent assessment   PROBLEM LIST (Impairments causing functional limitations):  Decreased Strength  Decreased ADL/Functional Activities  Decreased Transfer Abilities  Decreased Ambulation Ability/Technique  Decreased Balance  Decreased Activity Tolerance  Decreased Flexibility/Joint Mobility  Decreased Knowledge of Precautions   INTERVENTIONS PLANNED: (Benefits and precautions of physical therapy have been discussed with the patient.)  Balance Exercise  Bed Mobility  Family Education  Gait Training  Group Therapy  Home Exercise Program (HEP)  Neuromuscular Re-education/Strengthening  Range of Motion (ROM)  Therapeutic Activites  Therapeutic Exercise/Strengthening  Transfer Training     TREATMENT PLAN: Frequency/Duration: 3 times a week for duration of hospital stay  Rehabilitation Potential For Stated Goals: Good     REHAB RECOMMENDATIONS (at time of discharge pending progress):    Placement: It is my opinion, based on this patient's performance to date, that Ms. Ashly Jensen may benefit from [address] after discharge due to the functional deficits listed above that are likely to improve with skilled rehabilitation because patient demonstrates decreased activity tolerance and generalized weakness secondary to acute illness. Equipment:   Patient has a RW for home use currently; Recommending utilizing RW.    None at this time              HISTORY:   History of Present Injury/Illness (Reason for Referral):  Weakness; Difficulty in walking  Past Medical History/Comorbidities:   Ms. Anitha Zapata  has a past medical history of Acute UTI (urinary tract infection) (11/23/2019), Anxiety, Asthma, Atrial fibrillation ([address] Atypical ductal hyperplasia of breast, Back pain, CAD (coronary artery disease), Constipation (6/17/2015), CVA (cerebral vascular accident) ([address] (10/29/2010), CVA (cerebral vascular accident) ([address] (10/29/2010), DM (diabetes mellitus) ([address] DM (diabetes mellitus) ([address] Osteoarthrosis, unspecified whether generalized or localized, hand (9/4/2013), and Pain in joint, lower leg (9/4/2013). She also has no past medical history of Autoimmune disease ([address] Cancer ([address] Chronic kidney disease, COPD, Dementia, Heart failure ([address] Liver disease, Other ill-defined conditions(799.89), Psychiatric disorder, PUD (peptic ulcer disease), Seizures ([address] Sleep disorder, or Thromboembolus ([address] Ms. Anitha Zapata  has a past surgical history that includes hx cholecystectomy; hx other surgical; trigger point injection; hx carpal tunnel release; hx cataract removal (2005); hx cholecystectomy; and hx retinal detachment repair.   Social History/Living Environment:   Home Environment: Private residence  # Steps to Enter: 0  One/Two Story Residence: One story  Living Alone: No  Support Systems: Child(janet)  Patient Expects to be Discharged to[de-identified] Unknown  Current DME Used/Available at Home: Walker, rolling  Prior Level of Function/Work/Activity:  See assessment   Number of Personal Factors/Comorbidities that affect the Plan of Care: 1-2: MODERATE COMPLEXITY   EXAMINATION:   Most Recent Physical Functioning:   Gross Assessment:  AROM: Generally decreased, functional  Strength: Generally decreased, functional  Sensation: Intact(light touch B LE)               Posture:     Balance:  Sitting: Intact  Standing: Impaired  Standing - Static: Fair  Standing - Dynamic : Fair Bed Mobility:  Rolling: Minimum assistance  Supine to Sit: Minimum assistance  Scooting: Moderate assistance  Wheelchair Mobility:     Transfers:  Sit to Stand: Minimum assistance;Assist x2  Stand to Sit: Minimum assistance;Assist x2  Bed to Chair: Minimum assistance;Assist x2  Interventions: Safety awareness training;Verbal cues; Tactile cues  Gait:     Base of Support: Center of gravity altered;Narrowed  Speed/Katie: Shuffled; Slow  Gait Abnormalities: Decreased step clearance; Step to gait;Trunk sway increased  Distance (ft): 15 Feet (ft)  Assistive Device: Walker, rolling  Ambulation - Level of Assistance: Minimal assistance;Assist x2  Interventions: Safety awareness training; Tactile cues; Verbal cues      Body Structures Involved:  Metabolic  Muscles Body Functions Affected:  Neuromusculoskeletal  Movement Related Activities and Participation Affected: Mobility  Self Care   Number of elements that affect the Plan of Care: 4+: HIGH COMPLEXITY   CLINICAL PRESENTATION:   Presentation: Evolving clinical presentation with changing clinical characteristics: MODERATE COMPLEXITY   CLINICAL DECISION MAKIN [facility] Inpatient Short Form  How much difficulty does the patient currently have. .. Unable A Lot A Little None   1. Turning over in bed (including adjusting bedclothes, sheets and blankets)? [] 1   [] 2   [x] 3   [] 4   2. Sitting down on and standing up from a chair with arms ( e.g., wheelchair, bedside commode, etc.)   [] 1   [] 2   [x] 3   [] 4   3. Moving from lying on back to sitting on the side of the bed? [] 1   [] 2   [x] 3   [] 4   How much help from another person does the patient currently need. .. Total A Lot A Little None   4. Moving to and from a bed to a chair (including a wheelchair)? [] 1   [] 2   [x] 3   [] 4   5. Need to walk in hospital room? [] 1   [] 2   [x] 3   [] 4   6.   Climbing 3-5 steps with a railing? [] 1   [x] 2   [] 3   [] 4   © 2007, Trustees of [address] [place] [address] [address] [address], under license to [employer]. All rights reserved      Score:  Initial: 17 Most Recent: 17 (Date: 11/26/19 )    Interpretation of Tool:  Represents activities that are increasingly more difficult (i.e. Bed mobility, Transfers, Gait). Medical Necessity:     Patient demonstrates   good   rehab potential due to higher previous functional level. Reason for Services/Other Comments:  Patient continues to require skilled intervention due to   Decreased functional activity tolerance   . Use of outcome tool(s) and clinical judgement create a POC that gives a: Questionable prediction of patient's progress: MODERATE COMPLEXITY            TREATMENT:   (In addition to Assessment/Re-Assessment sessions the following treatments were rendered)   Pre-treatment Symptoms/Complaints:    Pain: Initial:   Pain Intensity 1: 0  Post Session:  0/10     Initial Evaluation (untimed charge)  Gait Training ( 10 Minutes):  Gait training to improve and/or restore physical functioning as related to mobility, strength, balance, and coordination. Ambulated 15 Feet (ft) with minimal assistance/contact guard assist and minimal visual, verbal, manual, and tactile cues related to their gait mechanics, RW negotiation, and dynamic standing balance. Assistive Device: Walker, rolling  Ambulation - Level of Assistance: Minimal assistance, Assist x2  Distance (ft): 15 Feet (ft)  Interventions: Safety awareness training, Tactile cues, Verbal cues    Braces/Orthotics/Lines/Etc:   IV  O2 Device: Room air  Treatment/Session Assessment:    Response to Treatment:  See above.   Interdisciplinary Collaboration:   Physical Therapist  Occupational Therapist  Registered Nurse  After treatment position/precautions:   Up in chair  Bed alarm/tab alert on  Bed/Chair-wheels locked  Bed in low position  Call light within reach  RN notified  Lap belt replaced   Compliance with Program/Exercises: Will assess as treatment progresses  Recommendations/Intent for next treatment session: \"Next visit will focus on advancements to more challenging activities and reduction in assistance provided\".   Total Treatment Duration:  PT Patient Time In/Time Out  Time In: 0913  Time Out: 5230 Tyra Parisi DPT

## 2019-11-26 NOTE — DISCHARGE SUMMARY
Hospitalist Discharge Summary     Admit Date:  2019  2:56 PM   Name:  Mathieu Jurado   Age:  80 y.o.  :  1926   MRN:  [id number]   PCP:  Nilda Watson MD  Treatment Team: Attending Provider: Jeff Sidhu MD; Utilization Review: Sue Hansen, LYNDON; Care Manager: Michel Freed, RN; Care Manager: Angel Luis Ferguson, RN; Primary Nurse: De Kind Charge Nurse: Oscar Minaya    Problem List for this Hospitalization:  Hospital Problems as of 2019 Date Reviewed: 2018          Codes Class Noted - Resolved POA    * (Principal) Acute encephalopathy ICD-10-CM: G93.40  ICD-9-CM: 348.30  2019 - Present Yes        Acute UTI (urinary tract infection) ICD-10-CM: N39.0  ICD-9-CM: 599.0  2019 - Present Yes        CAD (coronary artery disease) ICD-10-CM: I25.10  ICD-9-CM: 414.00  2019 - Present Yes        Abdominal pain ICD-10-CM: R10.9  ICD-9-CM: 789.00  2019 - Present Yes        Dementia ([facility] ICD-10-CM: F03.90  ICD-9-CM: 294.20  2019 - Present Yes        Type 2 diabetes with nephropathy ([facility] ICD-10-CM: E11.21  ICD-9-CM: 250.40, 583.81  2018 - Present Yes        Essential hypertension with goal blood pressure less than 140/90 ICD-10-CM: I10  ICD-9-CM: 401.9  2016 - Present Yes        Atrial fibrillation ([facility] ICD-10-CM: I48.91  ICD-9-CM: 427.31  Unknown - Present Yes        Constipation ICD-10-CM: K59.00  ICD-9-CM: 564.00  2015 - Present Yes        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 Pt with evidence of severe constipation on CT A/P and questionable UTI on UA.     Hospital Course:  Consultants none  Procedures none    Acute metabolic encephalopathy POA most likely medication induced  UTI ruled out  Chronic atrial fibrillation rate controlled not on anticoagulation due to increased risk of falls  Diabetes mellitus type 2  Asthma intermittent not in acute exacerbation POA    This is a 80-year-old female admitted for acute metabolic encephalopathy POA medication induced. Initially thought to be urinary tract infection but urine culture was negative. Ceftriaxone was discontinued. Patient's mental status has significantly improved to baseline. She tolerated Seroquel at bedtime and her mental status improved during the day. She has a left lower quadrant tenderness. CT abdomen and pelvis was obtained which showed constipation. With laxatives she had several bowel movements. Patient was evaluated by PT OT and recommendations for home health services which was arranged by case management prior to discharge. Patient was discharged home with home health services in a stable condition to follow-up with primary care physician in 3 to 5 days. [place] [facility] [place]: as tolerated  Diet: DIET DIABETIC CONSISTENT CARB Regular  DIET NUTRITIONAL SUPPLEMENTS All Meals; Glucerna Shake    Follow up instructions, discharge meds at bottom of this note. Plan was discussed with the pt. All questions answered. Patient was stable at time of discharge. Patient will call a physician or return if any concerns. Diagnostic Imaging/Tests:   Xr Chest Sngl V    Result Date: 11/24/2019  Portable AP supine chest dated 11/24/2019 at 1828 hours Prior chest x-ray 4/4/2013 CLINICAL INFORMATION: Leukocytosis, confusion Heart is enlarged. Mediastinum unremarkable. Lungs are clear. No pleural effusion.      IMPRESSION: No acute abnormality    Ct Head Wo Cont    Result Date: 11/24/2019  EXAMINATION: HEAD CT WITHOUT CONTRAST 11/24/2019 4:35 PM ACCESSION NUMBER: [id number] INDICATION: Focal neuro deficit, new, fixed or worsening, >24 hours, new onset altered mental status COMPARISON: Head CT 6/15/2011 TECHNIQUE: Multiple-row detector helical CT examination of the head without intravenous contrast. Radiation dose reduction techniques were used for this study:  Our CT scanners use one or all of the following: Automated exposure control, adjustment of the mA and/or kVp according to patient's size, iterative reconstruction. FINDINGS: The ventricles are within normal limits for the degree of global brain parenchymal volume loss. There is no midline shift. The basilar cisterns are patent. There is no cerebellar tonsillar ectopia or herniation. Hypodensities in the periventricular and subcortical white matter are nonspecific, but they are most likely to represent chronic microangiopathy. Prior bilateral lens replacement. There is no evidence of acute intracranial hemorrhage or extra-axial collections. There is no CT evidence of acute infarction. Minimal fluid in the left sphenoid sinus. IMPRESSION: No acute intracranial abnormality within the limits of patient motion. VOICE DICTATED BY: Dr. Slick Bennett    Result Date: 11/23/2019  CT ABDOMEN AND PELVIS WITH CONTRAST HISTORY: Abd pain, 80 years Female dementia and has been more altered then normal. Guarding left lower abdominal area. Not tender with palpation COMPARISON: None available TECHNIQUE: Oral contrast was not administered. 100 cc of nonionic intravenous contrast was injected, and axial helical CT images were obtained from above the diaphragm through the pelvis. Coronal reformatted images were obtained at the scanner console and made available for review.   All CT scans at this location are performed using dose modulation techniques as appropriate to a performed exam including the following: automated exposure control; adjustment of the mA and/or kV according to patient's size (this includes techniques or standardized protocols for targeted exams where dose is matched to indication/reason for exam; i.e. extremities or head); use of iterative reconstruction technique. FINDINGS: ABDOMEN: Minimal dependent subsegmental atelectasis bilateral lung bases. Mild diffuse intrahepatic and extrahepatic biliary ductal dilatation, which may be secondary to post cholecystectomy state. Mild diffuse atrophy of the pancreas. Normal-appearing spleen, bilateral adrenal glands and left kidney. Small simple appearing right renal cortical cyst.  Severe calcific atherosclerosis of a normal caliber abdominal aorta. No evidence of significant lymphadenopathy. Normal-appearing small bowel. No evidence of intraperitoneal free air or free fluid. Image quality somewhat degraded by motion artifact, despite repeat imaging. PELVIS: Normal-appearing urinary bladder. Normal-appearing atrophic uterus and bilateral adnexa. Large stool ball in the rectum. Stool is also seen throughout the colon. Appendix not visualized in the absence of oral contrast administration and significant motion artifact. No evidence of pelvic free fluid. No evidence of significant inguinal or pelvic sidewall lymphadenopathy. Visualized osseous structures unremarkable. IMPRESSION: No definite acute pathology identified, however there is significant motion artifact. Stool throughout the colon and rectum. Other incidental findings as above. Echocardiogram results:  No results found for this visit on 11/23/19.     Procedures done this admission:  * No surgery found *    All Micro Results     Procedure Component Value Units Date/Time    CULTURE, URINE [[id number]] Collected:  11/23/19 1617    Order Status:  Completed Specimen:  Urine from Clean catch Updated:  11/26/19 0637     Special Requests: NO SPECIAL REQUESTS        Culture result: NO GROWTH 2 DAYS       CULTURE, URINE [[id number]]     Order Status:  Canceled Specimen:  Urine from Clean catch           Labs: Results:       BMP, Mg, Phos Recent Labs     11/26/19  1115 11/24/19  0312    135* K 3.2* 3.4*    97*   CO2 29 26   AGAP 8 12   BUN 12 11   CREA 0.66 0.77   CA 8.9 9.3   * 138*   MG  --  1.8      CBC Recent Labs     11/26/19  1115 11/24/19  0312   WBC 11.7* 19.3*   RBC 4.64 4.77   HGB 13.7 13.9   HCT 41.5 42.0    343   GRANS 80*  --    LYMPH 10*  --    EOS 2  --    MONOS 7  --    BASOS 1  --    IG 0  --    ANEU 9.4*  --    ABL 1.2  --    KATHRIN 0.2  --    ABM 0.9  --    ABB 0.1  --    AIG 0.0  --       LFT No results for input(s): SGOT, ALT, TBIL, AP, TP, ALB, GLOB, AGRAT, GPT in the last 72 hours.    Cardiac Testing Lab Results   Component Value Date/Time    BNP 88 10/29/2010 05:05 PM    Troponin-I <0.05 10/29/2010 05:05 PM      Coagulation Tests Lab Results   Component Value Date/Time    Prothrombin time 10.8 05/13/2015 11:05 AM    Prothrombin time 10.2 10/29/2010 09:40 PM    INR 1.0 05/13/2015 11:05 AM    INR 1.0 10/29/2010 09:40 PM    aPTT 27.8 10/29/2010 09:40 PM      A1c Lab Results   Component Value Date/Time    Hemoglobin A1c 6.4 (H) 11/12/2019 10:11 AM    Hemoglobin A1c 6.2 (H) 08/13/2019 11:40 AM    Hemoglobin A1c 6.3 (H) 05/07/2019 10:00 AM      Lipid Panel Lab Results   Component Value Date/Time    Cholesterol, total 126 11/12/2019 10:11 AM    HDL Cholesterol 67 11/12/2019 10:11 AM    LDL, calculated 43 11/12/2019 10:11 AM    VLDL, calculated 16 11/12/2019 10:11 AM    Triglyceride 79 11/12/2019 10:11 AM    CHOL/HDL Ratio 2.3 12/06/2016 09:32 AM      Thyroid Panel Lab Results   Component Value Date/Time    TSH 3.230 05/07/2019 10:00 AM    TSH 1.620 05/11/2015 04:14 PM        Most Recent UA Lab Results   Component Value Date/Time    Color Yellow 02/14/2018 09:58 AM    Appearance Clear 02/14/2018 09:58 AM    pH (UA) 5.5 07/06/2017 04:36 PM    Protein 30 mg/dL (A) 07/06/2017 04:36 PM    Glucose Negative 07/06/2017 04:36 PM    Ketone Trace (A) 07/06/2017 04:36 PM    Bilirubin Small (A) 07/06/2017 04:36 PM    Blood Negative 07/06/2017 04:36 PM    Urobilinogen 1.0 E.U./dL 07/06/2017 04:36 PM    Nitrites Negative 07/06/2017 04:36 PM    Leukocyte Esterase Small (A) 07/06/2017 04:36 PM    WBC 20-50 11/23/2019 04:17 PM    RBC 0-3 11/23/2019 04:17 PM    Epithelial cells 0-3 11/23/2019 04:17 PM    Bacteria 0 11/23/2019 04:17 PM    Casts 3-5 11/23/2019 04:17 PM        Allergies   Allergen Reactions    Codeine Unknown (comments)     \"Makes her feel funny\"    Other Medication Other (comments)     sts lots of medicines make her sick.      Immunization History   Administered Date(s) Administered    (RETIRED) Pneumococcal Vaccine (Unspecified Type) 10/29/2010    Influenza High Dose Vaccine PF 09/07/2016, 11/21/2017, 11/20/2018    Influenza Vaccine 09/01/2012, 10/02/2013, 09/02/2014    Influenza Vaccine (Tri) Adjuvanted 11/12/2019    Influenza Vaccine PF 10/07/2015    Influenza Vaccine Split 10/29/2010    Influenza Vaccine Whole 10/06/2012    Pneumococcal Conjugate (PCV-13) 06/17/2015    Pneumococcal Vaccine (Pcv) 01/11/2007    Pneumococcal Vaccine (Unspecified Type) 01/01/2011    TB Skin Test (PPD) Intradermal 11/25/2019    Tdap 10/07/2015       All Labs from Last 24 Hrs:  Recent Results (from the past 24 hour(s))   GLUCOSE, POC    Collection Time: 11/25/19  9:21 PM   Result Value Ref Range    Glucose (POC) 112 (H) 65 - 100 mg/dL   GLUCOSE, POC    Collection Time: 11/26/19  8:23 AM   Result Value Ref Range    Glucose (POC) 124 (H) 65 - 100 mg/dL   GLUCOSE, POC    Collection Time: 11/26/19 10:36 AM   Result Value Ref Range    Glucose (POC) 165 (H) 65 - 100 mg/dL   CBC WITH AUTOMATED DIFF    Collection Time: 11/26/19 11:15 AM   Result Value Ref Range    WBC 11.7 (H) 4.3 - 11.1 K/uL    RBC 4.64 4.05 - 5.2 M/uL    HGB 13.7 11.7 - 15.4 g/dL    HCT 41.5 35.8 - 46.3 %    MCV 89.4 79.6 - 97.8 FL    MCH 29.5 26.1 - 32.9 PG    MCHC 33.0 31.4 - 35.0 g/dL    RDW 14.2 11.9 - 14.6 %    PLATELET 170 624 - 123 K/uL    MPV 10.0 9.4 - 12.3 FL    ABSOLUTE NRBC 0.00 0.0 - 0.2 K/uL    DF AUTOMATED NEUTROPHILS 80 (H) 43 - 78 %    LYMPHOCYTES 10 (L) 13 - 44 %    MONOCYTES 7 4.0 - 12.0 %    EOSINOPHILS 2 0.5 - 7.8 %    BASOPHILS 1 0.0 - 2.0 %    IMMATURE GRANULOCYTES 0 0.0 - 5.0 %    ABS. NEUTROPHILS 9.4 (H) 1.7 - 8.2 K/UL    ABS. LYMPHOCYTES 1.2 0.5 - 4.6 K/UL    ABS. MONOCYTES 0.9 0.1 - 1.3 K/UL    ABS. EOSINOPHILS 0.2 0.0 - 0.8 K/UL    ABS. BASOPHILS 0.1 0.0 - 0.2 K/UL    ABS. IMM.  GRANS. 0.0 0.0 - 0.5 K/UL   METABOLIC PANEL, BASIC    Collection Time: 11/26/19 11:15 AM   Result Value Ref Range    Sodium 138 136 - 145 mmol/L    Potassium 3.2 (L) 3.5 - 5.1 mmol/L    Chloride 101 98 - 107 mmol/L    CO2 29 21 - 32 mmol/L    Anion gap 8 7 - 16 mmol/L    Glucose 140 (H) 65 - 100 mg/dL    BUN 12 8 - 23 MG/DL    Creatinine 0.66 0.6 - 1.0 MG/DL    GFR est AA >60 >60 ml/min/1.73m2    GFR est non-AA >60 >60 ml/min/1.73m2    Calcium 8.9 8.3 - 10.4 MG/DL   PLEASE READ & DOCUMENT PPD TEST IN 24 HRS    Collection Time: 11/26/19  2:48 PM   Result Value Ref Range    PPD Negative Negative    mm Induration 0 0 - 5 mm   GLUCOSE, POC    Collection Time: 11/26/19  4:23 PM   Result Value Ref Range    Glucose (POC) 102 (H) 65 - 100 mg/dL       Current Med List in Hospital:   Current Facility-Administered Medications   Medication Dose Route Frequency    QUEtiapine (SEROquel) tablet 12.5 mg  12.5 mg Oral QPM    budesonide (PULMICORT) 500 mcg/2 ml nebulizer suspension  500 mcg Nebulization BID RT    calcium-vitamin D (OS-JANENE) 500 mg-200 unit tablet  1 Tab Oral BID WITH MEALS    albuterol-ipratropium (DUO-NEB) 2.5 MG-0.5 MG/3 ML  3 mL Nebulization TID RT    famotidine (PEPCID) tablet 20 mg  20 mg Oral QHS    pravastatin (PRAVACHOL) tablet 40 mg  40 mg Oral QHS    multivitamin, tx-iron-ca-min (THERA-M w/ IRON) tablet 1 Tab  1 Tab Oral DAILY    donepezil (ARICEPT) tablet 5 mg  5 mg Oral QHS    docusate sodium (COLACE) capsule 100 mg  100 mg Oral DAILY    dilTIAZem CD (CARDIZEM CD) capsule 240 mg  240 mg Oral DAILY    diclofenac (VOLTAREN) 1 % topical gel 4 g (Patient Supplied)  4 g Topical QID    cyanocobalamin tablet 1,000 mcg  1,000 mcg Oral Q48H    aspirin tablet 325 mg  325 mg Oral DAILY    dextrose 40% (GLUTOSE) oral gel 1 Tube  15 g Oral PRN    glucagon (GLUCAGEN) injection 1 mg  1 mg IntraMUSCular PRN    dextrose (D50W) injection syrg 12.5-25 g  25-50 mL IntraVENous PRN    insulin lispro (HUMALOG) injection   SubCUTAneous AC&HS    sodium chloride (NS) flush 5-40 mL  5-40 mL IntraVENous Q8H    sodium chloride (NS) flush 5-40 mL  5-40 mL IntraVENous PRN    acetaminophen (TYLENOL) tablet 650 mg  650 mg Oral Q4H PRN    bisacodyl (DULCOLAX) tablet 5 mg  5 mg Oral DAILY PRN    enoxaparin (LOVENOX) injection 40 mg  40 mg SubCUTAneous Q24H    0.9% sodium chloride infusion  75 mL/hr IntraVENous CONTINUOUS       Discharge Exam:  Patient Vitals for the past 24 hrs:   Temp Pulse Resp BP SpO2   11/26/19 1449 98.6 °F (37 °C) 86 19 118/78 95 %   11/26/19 1336     97 %   11/26/19 1038 97.5 °F (36.4 °C) (!) 104 20 116/77 97 %   11/26/19 0817 97.6 °F (36.4 °C) (!) 104 20 165/84 94 %   11/26/19 0737     98 %   11/26/19 0001 98.3 °F (36.8 °C) (!) 109 20 143/89 96 %   11/25/19 2036 98.3 °F (36.8 °C) (!) 109 16 142/72 95 %   11/25/19 1944     96 %     Oxygen Therapy  O2 Sat (%): 95 % (11/26/19 1449)  Pulse via Oximetry: 68 beats per minute (11/26/19 1336)  O2 Device: Room air (11/26/19 1336)  No intake or output data in the 24 hours ending 11/26/19 1640    *Note that automatically entered I/Os may not be accurate; dependent on patient compliance with collection and accurate  by assistants. General:    Well nourished. Alert. Eyes:   Normal sclerae. Extraocular movements intact. ENT:  Normocephalic, atraumatic. Moist mucous membranes  CV:   Regular rate and rhythm. No murmur, rub, or gallop. Lungs:  Clear to auscultation bilaterally. No wheezing, rhonchi, or rales. Abdomen: Soft, nontender, nondistended. Bowel sounds normal.   Extremities: Warm and dry. No cyanosis or edema. Neurologic: CN II-XII grossly intact. Sensation intact. Skin:     No rashes or jaundice. Psych:  Normal mood and affect. Discharge Info:   Current Discharge Medication List      START taking these medications    Details   QUEtiapine (SEROQUEL) 25 mg tablet Take 0.5 Tabs by mouth every evening for 30 days. Qty: 15 Tab, Refills: 0         CONTINUE these medications which have NOT CHANGED    Details   dilTIAZem CD (CARDIZEM CD) 240 mg ER capsule TAKE ONE CAPSULE BY MOUTH EVERY DAY  Qty: 90 Cap, Refills: 3      metFORMIN (GLUCOPHAGE) 500 mg tablet Take 1 Tab by mouth daily (with breakfast). Qty: 90 Tab, Refills: 3      pravastatin (PRAVACHOL) 40 mg tablet Take 1 Tab by mouth nightly. Qty: 90 Tab, Refills: 3      hydroCHLOROthiazide (MICROZIDE) 12.5 mg capsule TAKE ONE CAPSULE EVERY DAY  Qty: 90 Cap, Refills: 3      budesonide-formoterol (SYMBICORT) 160-4.5 mcg/actuation HFAA Take 2 Puffs by inhalation two (2) times a day. Qty: 30.6 Inhaler, Refills: 3      LORazepam (ATIVAN) 0.5 mg tablet Take 0.5 Tabs by mouth daily as needed for Anxiety. Qty: 30 Tab, Refills: 2    Associated Diagnoses: Anxiety      traMADol (ULTRAM) 50 mg tablet Take 1 Tab by mouth every six (6) hours as needed for Pain for up to 30 days. Max Daily Amount: 200 mg. Qty: 60 Tab, Refills: 1    Associated Diagnoses: Chronic pain syndrome      donepezil (ARICEPT) 5 mg tablet Take 1 Tab by mouth nightly. Qty: 30 Tab, Refills: 3      docusate sodium (STOOL SOFTENER PO) Take 1 Cap by mouth daily. diclofenac (VOLTAREN) 1 % gel Apply 4 g to affected area four (4) times daily. Qty: 1 Each, Refills: 3      albuterol-ipratropium (DUONEB) 2.5 mg-0.5 mg/3 ml nebu 3 mL by Nebulization route three (3) times daily.   Qty: 90 Nebule, Refills: 12      albuterol (PROVENTIL HFA, VENTOLIN HFA, PROAIR HFA) 90 mcg/actuation inhaler Take 2 Puffs by inhalation every six (6) hours as needed for Wheezing. Qty: 1 Inhaler, Refills: 3      raNITIdine (ZANTAC) 150 mg tablet Take 150 mg by mouth nightly. OTHER One pair  Diabetic shoes  Qty: 1 Each, Refills: 0      PSYLLIUM SEED, WITH DEXTROSE, (FIBER PO) Take  by mouth. ONE DAILY MULTIVITAMIN PO Take 1 Tab by mouth daily. ACETAMINOPHEN (TYLENOL ARTHRITIS PO) Take 2 Tabs by mouth daily. CYANOCOBALAMIN, VITAMIN B-12, (VITAMIN B-12 PO) Take  by mouth. Every other day      aspirin (ASPIRIN) 325 mg tablet Take 325 mg by mouth daily. calcium-vitamin D (CALCIUM 500+D) 500 mg(1,250mg) -200 unit per tablet Take 1 Tab by mouth two (2) times daily (with meals). Follow Up Orders: Follow-up Appointments   Procedures    FOLLOW UP VISIT Appointment in: 3 - 5 Days F/u with PCP in 3-5 days     F/u with PCP in 3-5 days     Standing Status:   Standing     Number of Occurrences:   1     Order Specific Question:   Appointment in     Answer:   3 - 5 Days       Follow-up Information     Follow up With Specialties Details Why Contact Info    Melissa Weber MD Family Practice In 1 week Office will call you with hospital followup appointment. [facility]  someone with home care will contact you with in 48 hours to follow up for home visit. [address]          Time spent in patient discharge planning and coordination 42 minutes.     Signed:  Sadiq Gaines MD

## 2019-11-26 NOTE — PROGRESS NOTES
[address]  Face to Face Encounter    Patients Name: Sanjuana Kinsey    YOB: 1926    Ordering Physician: Dr Leti Ashley    Primary Diagnosis: Acute encephalopathy [G93.40]  Acute UTI (urinary tract infection) [N39.0]    Date of Face to Face:   11/26/2019                                  Face to Face Encounter findings are related to primary reason for home care:   yes. 1. I certify that the patient needs intermittent care as follows: physical therapy: strengthening, stretching/ROM, transfer training, gait/stair training, balance training and pt/caregiver education  occupational therapy:  ADL safety (ie. cooking, bathing, dressing), ROM and pt/caregiver education    2. I certify that this patient is homebound, that is: 1) patient requires the use of a walker device, special transportation, or assistance of another to leave the home; or 2) patient's condition makes leaving the home medically contraindicated; and 3) patient has a normal inability to leave the home and leaving the home requires considerable and taxing effort. Patient may leave the home for infrequent and short duration for medical reasons, and occasional absences for non-medical reasons. Homebound status is due to the following functional limitations: Patient with strength deficits limiting the performance of all ADL's without caregiver assistance or the use of an assistive device. Patient with poor safety awareness and is at risk for falls without assistance of another person and the use of an assistive device. Patient with poor ambulation endurance limiting their safe ability to ascend/descend the required number of steps to leave the home. 3. I certify that this patient is under my care and that I, or a nurse practitioner or [address] [place] [address], or clinical nurse specialist, or certified nurse midwife, working with me, had a Face-to-Face Encounter that meets the physician Face-to-Face Encounter requirements.   The following are the clinical findings from the [facility] encounter that support the need for skilled services and is a summary of the encounter:     See hospital chart      Ephraim Benson RN  11/26/2019      THE FOLLOWING TO BE COMPLETED BY THE COMMUNITY PHYSICIAN:    I concur with the findings described above from the F2F encounter that this patient is homebound and in need of a skilled service.     Certifying Physician: _____________________________________      Printed Certifying Physician Name: _____________________________________    Date: _________________

## 2019-11-26 NOTE — DISCHARGE INSTRUCTIONS
Patient Education        Altered Mental Status: Care Instructions  Your Care Instructions    Altered mental status is a change in how well your brain is working. As a result, you may be confused, be less alert than usual, or act in odd ways. This may include seeing or hearing things that aren't really there (hallucinations). A mental status change has many possible causes. For example, it may be the result of an infection, an imbalance of chemicals in the body, or a chronic disease such as diabetes or COPD. It can also be caused by things such as a head injury, taking certain medicines, or using alcohol or drugs. The doctor may do tests to look for the cause. These tests may include urine tests, blood tests, and imaging tests such as a CT scan. Sometimes a clear cause isn't found. But tests can help the doctor rule out a serious cause of your symptoms. A change in mental status can be scary. But mental status will often return to normal when the cause is treated. So it is important to get any follow-up testing or treatment the doctor has suggested. The doctor has checked you carefully, but problems can develop later. If you notice any problems or new symptoms, get medical treatment right away. Follow-up care is a key part of your treatment and safety. Be sure to make and go to all appointments, and call your doctor if you are having problems. It's also a good idea to know your test results and keep a list of the medicines you take. How can you care for yourself at home? · Be safe with medicines. Take your medicines exactly as prescribed. Call your doctor if you think you are having a problem with your medicine. · Have another adult stay with you until you are better. This can help keep you safe. Ask that person to watch for signs that your mental status is getting worse. When should you call for help? Call 911 anytime you think you may need emergency care.  For example, call if:    · You passed out (lost consciousness).    Call your doctor now or seek immediate medical care if:    · Your mental status is getting worse.     · You have new symptoms, such as a fever, chills, or shortness of breath.     · You do not feel safe.    Watch closely for changes in your health, and be sure to contact your doctor if:    · You do not get better as expected. Where can you learn more? Go to http://alexandra-jimmy.info/. Enter Q314 in the search box to learn more about \"Altered Mental Status: Care Instructions. \"  Current as of: March 28, 2019  Content Version: 12.2  © 5062-9309 [employer]. Care instructions adapted under license by [employer] (which disclaims liability or warranty for this information). If you have questions about a medical condition or this instruction, always ask your healthcare professional. [address] any warranty or liability for your use of this information. Patient Education        Constipation: Care Instructions  Your Care Instructions    Constipation means that you have a hard time passing stools (bowel movements). People pass stools from 3 times a day to once every 3 days. What is normal for you may be different. Constipation may occur with pain in the rectum and cramping. The pain may get worse when you try to pass stools. Sometimes there are small amounts of bright red blood on toilet paper or the surface of stools. This is because of enlarged veins near the rectum (hemorrhoids). A few changes in your diet and lifestyle may help you avoid ongoing constipation. Your doctor may also prescribe medicine to help loosen your stool. Some medicines can cause constipation. These include pain medicines and antidepressants. Tell your doctor about all the medicines you take. Your doctor may want to make a medicine change to ease your symptoms. Follow-up care is a key part of your treatment and safety.  Be sure to make and go to all appointments, and call your doctor if you are having problems. It's also a good idea to know your test results and keep a list of the medicines you take. How can you care for yourself at home? · Drink plenty of fluids, enough so that your urine is light yellow or clear like water. If you have kidney, heart, or liver disease and have to limit fluids, talk with your doctor before you increase the amount of fluids you drink. · Include high-fiber foods in your diet each day. These include fruits, vegetables, beans, and whole grains. · Get at least 30 minutes of exercise on most days of the week. Walking is a good choice. You also may want to do other activities, such as running, swimming, cycling, or playing tennis or team sports. · Take a fiber supplement, such as Citrucel or Metamucil, every day. Read and follow all instructions on the label. · Schedule time each day for a bowel movement. A daily routine may help. Take your time having your bowel movement. · Support your feet with a small step stool when you sit on the toilet. This helps flex your hips and places your pelvis in a squatting position. · Your doctor may recommend an over-the-counter laxative to relieve your constipation. Examples are Milk of Magnesia and MiraLax. Read and follow all instructions on the label. Do not use laxatives on a long-term basis. When should you call for help? Call your doctor now or seek immediate medical care if:    · You have new or worse belly pain.     · You have new or worse nausea or vomiting.     · You have blood in your stools.    Watch closely for changes in your health, and be sure to contact your doctor if:    · Your constipation is getting worse.     · You do not get better as expected. Where can you learn more? Go to http://alexandra-jimmy.info/. Enter 21 [phone] in the search box to learn more about \"Constipation: Care Instructions. \"  Current as of: June 26, 2019  Content Version: 12.2  © 0217-3274 [employer]. Care instructions adapted under license by [employer] (which disclaims liability or warranty for this information). If you have questions about a medical condition or this instruction, always ask your healthcare professional. [address] any warranty or liability for your use of this information. DISCHARGE SUMMARY from Nurse    PATIENT INSTRUCTIONS:    After general anesthesia or intravenous sedation, for 24 hours or while taking prescription Narcotics:  · Limit your activities  · Do not drive and operate hazardous machinery  · Do not make important personal or business decisions  · Do  not drink alcoholic beverages  · If you have not urinated within 8 hours after discharge, please contact your surgeon on call. Report the following to your surgeon:  · Excessive pain, swelling, redness or odor of or around the surgical area  · Temperature over 100.5  · Nausea and vomiting lasting longer than 4 hours or if unable to take medications  · Any signs of decreased circulation or nerve impairment to extremity: change in color, persistent  numbness, tingling, coldness or increase pain  · Any questions    What to do at Home:  Recommended activity: Activity as tolerated,     If you experience any of the following symptoms see discharge instructions, please follow up with primary care. *  Please give a list of your current medications to your Primary Care Provider. *  Please update this list whenever your medications are discontinued, doses are      changed, or new medications (including over-the-counter products) are added. *  Please carry medication information at all times in case of emergency situations.     These are general instructions for a healthy lifestyle:    No smoking/ No tobacco products/ Avoid exposure to second hand smoke  Surgeon General's Warning:  Quitting smoking now greatly reduces serious risk to your health. Obesity, smoking, and sedentary lifestyle greatly increases your risk for illness    A healthy diet, regular physical exercise & weight monitoring are important for maintaining a healthy lifestyle    You may be retaining fluid if you have a history of heart failure or if you experience any of the following symptoms:  Weight gain of 3 pounds or more overnight or 5 pounds in a week, increased swelling in our hands or feet or shortness of breath while lying flat in bed. Please call your doctor as soon as you notice any of these symptoms; do not wait until your next office visit. The discharge information has been reviewed with the caregiver. The caregiver verbalized understanding. Discharge medications reviewed with the caregiver and appropriate educational materials and side effects teaching were provided.   ___________________________________________________________________________________________________________________________________

## 2019-11-26 NOTE — PROGRESS NOTES
Pt ready for DC home today, daughter agreeable to home with [facility] [place], orders and referral completed. [facility] [address] [place] [address] Street, she is aware pt being dc home today and requested clinical to be faxed [phone], verbal approval obtained from daughter to fax information. jennifer Redd contacted and updated on pts dc plan, states he is coming to assist with pts dc home and wants to speak with md when he arrives, Dr Shavon Flores contacted and made aware family coming and wants to speak with him.

## 2019-11-26 NOTE — PROGRESS NOTES
Discharge instructions  all new medications,medication side effects sheet, follow up appointment and  prescriptions reviewed and explained to the daughter . Daughter verbalizes understanding of instructions. A copy of discharge instructions and prescriptions  have been given to the daughter. Opportunity for questions provided.       Family to assist daughter in picking  patient up  later this evening

## 2019-11-26 NOTE — PROGRESS NOTES
Called spoke with daughter about dc plans for home with home care, daughter was not acceptable to to and requested to speak with CM when she arrived to room, CM met with daughter and pt at bedside, daughter ok with explanation that pt does not qualify for STR at this time. Then she states that she and her nephew Gabbie Jenny where talking with Marjorie Medellin at the [facility] [address] for TYE. CM called and left message at [facility] to discus possible admission, family aware of pt will possibly be dc home then to be admitted to [facility] [place] [address].     CM will remain available

## 2019-11-27 ENCOUNTER — HOME CARE VISIT (OUTPATIENT)
Dept: HOME HEALTH SERVICES | Facility: HOME HEALTH | Age: 84
End: 2019-11-27

## 2019-11-27 ENCOUNTER — PATIENT OUTREACH (OUTPATIENT)
Dept: CASE MANAGEMENT | Age: 84
End: 2019-11-27

## 2019-11-27 NOTE — PROGRESS NOTES
This note will not be viewable in [address] Date/Time of Call: 11/27/19 940am   What was the patient hospitalized for? Acute Encephalopathy   Consent for ROMANA SHEEHAN Call       Does the patient understand his/her diagnosis and/or treatment and what happened during the hospitalization? Called and spoke with patients daughter Domenic Jay. She agrees to call and states that the patient is doing good  Yes, she verbalizes understanding of diagnosis and hospitalization    Did the patient receive discharge instructions? Yes   CM Assessed Risk for Readmission:         Patient stated Risk for Readmission:  Patient is a moderate to high risk for readmission due to diagnoses and or comorbidities    No concerns at this time      Review any discharge instructions (see discharge instructions/AVS in [facility]). Ask patient if they understand these. Do they have any questions? reviewed DC instructions   Were home services ordered (nursing, PT, OT, ST, etc.)? Yes    If so, has the first visit occurred? If not, why? (Assist with coordination of services if necessary.)   Domenic Jay states that they have received a call from [facility] and was told that an RN would be out within 48 hours and will call back to schedule SOC date and time    Was any DME ordered? No    If so, has it been received? If not, why?  (Assist patient in obtaining DME orders &/or equipment if necessary.) Patient has a RW and BSC in the home    Complete a review of all medications (new, continued and discontinued meds per the D/C instructions and medication tab in [facility]). Medications Reviewed     Domenic Jay states that Seroquel was added   Were all new prescriptions filled? If not, why?  (Assist patient in obtaining medications if necessary  escalate for CCM &/or SW if ongoing issues are verbalized by pt or anticipated)   Yes    Does the patient understand the purpose and dosing instructions for all medications?   (If patient has questions, provide explanation and education.) Malinda verbalizes understanding of medications    Does the patient have any problems in performing ADLs? (If patient is unable to perform ADLs  what is the limiting factor(s)? Do they have a support system that can assist? If no support system is present, discuss possible assistance that they may be able to obtain. Escalate for CCM/SW if ongoing issues are verbalized by pt or anticipated)   Patients family assists with all ADLs: patient's family is working on admission to [facility]    Does the patient have all follow-up appointments scheduled? 7 day f/up with PCP?   (f/up with PCP may be w/in 14 days if patient has a f/up with their specialist w/in 7 days)    7-14 day f/up with specialist?   (or per discharge instructions)    If f/up has not been made  what actions has the care coordinator made to accomplish this? Has transportation been arranged? Yes       12/03/19 at 415pm        NA      Reviewed appointment information with [facility]        Yes, no concerns    Any other questions or concerns expressed by the patient? Patients daughter denies any questions or concerns at this time. Care Coordinator contact information provided should needs arise. Schedule next appointment with ROMANA SHEEHAN Coordinator or refer to RN Case Manager/ per the workflow guidelines. When is care coordinators next follow-up call scheduled? If referred for CCM  what RN care manager was the referral assigned?  Within 7 - 14 days          Within 7  14 days       NA   JOHNNY Call Completed By: Beth Stokes, [facility] Coordinator

## 2019-12-02 ENCOUNTER — HOME CARE VISIT (OUTPATIENT)
Dept: HOME HEALTH SERVICES | Facility: HOME HEALTH | Age: 84
End: 2019-12-02

## 2019-12-02 ENCOUNTER — HOME CARE VISIT (OUTPATIENT)
Dept: SCHEDULING | Facility: HOME HEALTH | Age: 84
End: 2019-12-02
Payer: MEDICARE

## 2019-12-02 PROCEDURE — 400013 HH SOC

## 2019-12-02 PROCEDURE — G0151 HHCP-SERV OF PT,EA 15 MIN: HCPCS

## 2019-12-03 VITALS
SYSTOLIC BLOOD PRESSURE: 118 MMHG | DIASTOLIC BLOOD PRESSURE: 72 MMHG | TEMPERATURE: 99.8 F | RESPIRATION RATE: 18 BRPM | OXYGEN SATURATION: 97 % | HEART RATE: 72 BPM

## 2019-12-04 ENCOUNTER — HOME CARE VISIT (OUTPATIENT)
Dept: SCHEDULING | Facility: HOME HEALTH | Age: 84
End: 2019-12-04
Payer: MEDICARE

## 2019-12-04 VITALS
TEMPERATURE: 98.2 F | HEART RATE: 88 BPM | SYSTOLIC BLOOD PRESSURE: 112 MMHG | DIASTOLIC BLOOD PRESSURE: 76 MMHG | RESPIRATION RATE: 21 BRPM

## 2019-12-04 PROCEDURE — G0157 HHC PT ASSISTANT EA 15: HCPCS

## 2019-12-05 ENCOUNTER — HOME CARE VISIT (OUTPATIENT)
Dept: SCHEDULING | Facility: HOME HEALTH | Age: 84
End: 2019-12-05
Payer: MEDICARE

## 2019-12-05 VITALS
SYSTOLIC BLOOD PRESSURE: 122 MMHG | HEART RATE: 78 BPM | TEMPERATURE: 98.1 F | RESPIRATION RATE: 18 BRPM | DIASTOLIC BLOOD PRESSURE: 68 MMHG

## 2019-12-05 PROCEDURE — G0152 HHCP-SERV OF OT,EA 15 MIN: HCPCS

## 2019-12-10 ENCOUNTER — HOME CARE VISIT (OUTPATIENT)
Dept: HOME HEALTH SERVICES | Facility: HOME HEALTH | Age: 84
End: 2019-12-10
Payer: MEDICARE

## 2019-12-11 ENCOUNTER — HOME CARE VISIT (OUTPATIENT)
Dept: HOME HEALTH SERVICES | Facility: HOME HEALTH | Age: 84
End: 2019-12-11
Payer: MEDICARE

## 2019-12-11 ENCOUNTER — PATIENT OUTREACH (OUTPATIENT)
Dept: CASE MANAGEMENT | Age: 84
End: 2019-12-11

## 2019-12-11 NOTE — PROGRESS NOTES
This note will not be viewable in [address] Transitions of Care  Follow up Outreach Note   Outreach type Phone call: Spoke with patients daughter Neeru Kwon   Date/Time of Outreach: 12/11/19 at 1030am     Has patient attended PCP or specialist follow-up appointments since last contact? What was outcome of appointment? When is next follow-up scheduled? Neeru Kwon states that the patient is doing pretty good. she states that she has good days and bad days. She has completed FU with PCP with FUs scheduled appropriately    Review medications. Any medication changes since last outreach? Does patient have any questions or issues related to their medications? Patient has medications, no significant changes, no questions or concerns      Home health active? If yes  any issue? Progress? HH currently active      Referrals needed?  (CM, SW, HH, etc.)   NA   Other issues/Miscellaneous? (Transportation, access to meals, ability to perform ADLs, adequate caregiver support, etc.) No questions or concerns are voiced at this time. Care Coordinator contact information provided should needs arise   Next Outreach Scheduled?     Graduation from program?   NA    Yes     Next Steps/Goals (if applicable):   NA     Outreach completed by:   Zain Wilhelm [address] Coordinator